# Patient Record
Sex: FEMALE | Race: BLACK OR AFRICAN AMERICAN | URBAN - METROPOLITAN AREA
[De-identification: names, ages, dates, MRNs, and addresses within clinical notes are randomized per-mention and may not be internally consistent; named-entity substitution may affect disease eponyms.]

---

## 2018-10-09 ENCOUNTER — ED HISTORICAL/CONVERTED ENCOUNTER (OUTPATIENT)
Dept: OTHER | Age: 68
End: 2018-10-09

## 2018-11-17 ENCOUNTER — IP HISTORICAL/CONVERTED ENCOUNTER (OUTPATIENT)
Dept: OTHER | Age: 68
End: 2018-11-17

## 2020-10-06 ENCOUNTER — APPOINTMENT (OUTPATIENT)
Dept: GENERAL RADIOLOGY | Age: 70
End: 2020-10-06
Attending: EMERGENCY MEDICINE
Payer: MEDICARE

## 2020-10-06 ENCOUNTER — APPOINTMENT (OUTPATIENT)
Dept: CT IMAGING | Age: 70
End: 2020-10-06
Attending: EMERGENCY MEDICINE
Payer: MEDICARE

## 2020-10-06 ENCOUNTER — HOSPITAL ENCOUNTER (EMERGENCY)
Age: 70
Discharge: HOME | End: 2020-10-06
Attending: EMERGENCY MEDICINE
Payer: MEDICARE

## 2020-10-06 DIAGNOSIS — K29.00 ACUTE SUPERFICIAL GASTRITIS WITHOUT HEMORRHAGE: Primary | ICD-10-CM

## 2020-10-06 LAB
ALBUMIN SERPL-MCNC: 3.3 G/DL (ref 3.5–5)
ALBUMIN/GLOB SERPL: 0.9 {RATIO} (ref 1.1–2.2)
ALP SERPL-CCNC: 140 U/L (ref 45–117)
ALT SERPL-CCNC: 41 U/L (ref 12–78)
AMYLASE SERPL-CCNC: 76 U/L (ref 25–115)
ANION GAP SERPL CALC-SCNC: 7 MMOL/L (ref 5–15)
AST SERPL W P-5'-P-CCNC: 19 U/L (ref 15–37)
BASOPHILS # BLD: 0 K/UL (ref 0–0.2)
BASOPHILS NFR BLD: 0 % (ref 0–2.5)
BILIRUB SERPL-MCNC: 0.3 MG/DL (ref 0.2–1)
BUN SERPL-MCNC: 22 MG/DL (ref 6–20)
BUN/CREAT SERPL: 4 (ref 12–20)
CA-I BLD-MCNC: 8.6 MG/DL (ref 8.5–10.1)
CHLORIDE SERPL-SCNC: 98 MMOL/L (ref 97–108)
CO2 SERPL-SCNC: 30 MMOL/L (ref 21–32)
CREAT SERPL-MCNC: 5.77 MG/DL (ref 0.55–1.02)
EOSINOPHIL # BLD: 0.3 K/UL (ref 0–0.7)
EOSINOPHIL NFR BLD: 5 % (ref 0.9–2.9)
ERYTHROCYTE [DISTWIDTH] IN BLOOD BY AUTOMATED COUNT: 17.5 % (ref 11.5–14.5)
GLOBULIN SER CALC-MCNC: 3.7 G/DL (ref 2–4)
GLUCOSE SERPL-MCNC: 195 MG/DL (ref 65–100)
HCT VFR BLD AUTO: 33 % (ref 36–46)
HGB BLD-MCNC: 10.9 G/DL (ref 13.5–17.5)
LIPASE SERPL-CCNC: 68 U/L (ref 73–393)
LYMPHOCYTES # BLD: 1.2 K/UL (ref 1–4.8)
LYMPHOCYTES NFR BLD: 22 % (ref 20.5–51.1)
MCH RBC QN AUTO: 32.8 PG (ref 31–34)
MCHC RBC AUTO-ENTMCNC: 33.1 G/DL (ref 31–36)
MCV RBC AUTO: 99 FL (ref 80–100)
MONOCYTES # BLD: 0.6 K/UL (ref 0.2–2.4)
MONOCYTES NFR BLD: 11 % (ref 1.7–9.3)
NEUTS SEG # BLD: 3.4 K/UL (ref 1.8–7.7)
NEUTS SEG NFR BLD: 62 % (ref 42–75)
NRBC # BLD: 0 K/UL
NRBC BLD-RTO: 0 PER 100 WBC
PLATELET # BLD AUTO: 130 K/UL
PMV BLD AUTO: 7.3 FL (ref 6.5–11.5)
POTASSIUM SERPL-SCNC: 3.9 MMOL/L (ref 3.5–5.1)
PROT SERPL-MCNC: 7 G/DL (ref 6.4–8.2)
RBC # BLD AUTO: 3.33 M/UL (ref 4.5–5.9)
SODIUM SERPL-SCNC: 135 MMOL/L (ref 136–145)
WBC # BLD AUTO: 5.6 K/UL (ref 4.4–11.3)

## 2020-10-06 PROCEDURE — 36415 COLL VENOUS BLD VENIPUNCTURE: CPT

## 2020-10-06 PROCEDURE — 74011250636 HC RX REV CODE- 250/636: Performed by: EMERGENCY MEDICINE

## 2020-10-06 PROCEDURE — 96375 TX/PRO/DX INJ NEW DRUG ADDON: CPT

## 2020-10-06 PROCEDURE — 74176 CT ABD & PELVIS W/O CONTRAST: CPT

## 2020-10-06 PROCEDURE — 82150 ASSAY OF AMYLASE: CPT

## 2020-10-06 PROCEDURE — 71045 X-RAY EXAM CHEST 1 VIEW: CPT

## 2020-10-06 PROCEDURE — 96374 THER/PROPH/DIAG INJ IV PUSH: CPT

## 2020-10-06 PROCEDURE — 85025 COMPLETE CBC W/AUTO DIFF WBC: CPT

## 2020-10-06 PROCEDURE — 99283 EMERGENCY DEPT VISIT LOW MDM: CPT

## 2020-10-06 PROCEDURE — 83690 ASSAY OF LIPASE: CPT

## 2020-10-06 PROCEDURE — 80053 COMPREHEN METABOLIC PANEL: CPT

## 2020-10-06 RX ORDER — PANTOPRAZOLE SODIUM 40 MG/1
40 TABLET, DELAYED RELEASE ORAL
Status: DISCONTINUED | OUTPATIENT
Start: 2020-10-06 | End: 2020-10-07 | Stop reason: HOSPADM

## 2020-10-06 RX ORDER — PANTOPRAZOLE SODIUM 40 MG/1
40 TABLET, DELAYED RELEASE ORAL DAILY
Qty: 20 TAB | Refills: 0 | Status: SHIPPED | OUTPATIENT
Start: 2020-10-06 | End: 2020-10-26

## 2020-10-06 RX ORDER — MORPHINE SULFATE 2 MG/ML
2 INJECTION, SOLUTION INTRAMUSCULAR; INTRAVENOUS
Status: COMPLETED | OUTPATIENT
Start: 2020-10-06 | End: 2020-10-06

## 2020-10-06 RX ORDER — ONDANSETRON 2 MG/ML
4 INJECTION INTRAMUSCULAR; INTRAVENOUS
Status: COMPLETED | OUTPATIENT
Start: 2020-10-06 | End: 2020-10-06

## 2020-10-06 RX ADMIN — MORPHINE SULFATE 2 MG: 2 INJECTION, SOLUTION INTRAMUSCULAR; INTRAVENOUS at 18:24

## 2020-10-06 RX ADMIN — ONDANSETRON 4 MG: 2 INJECTION INTRAMUSCULAR; INTRAVENOUS at 18:24

## 2020-10-07 VITALS
WEIGHT: 250 LBS | DIASTOLIC BLOOD PRESSURE: 78 MMHG | HEIGHT: 70 IN | TEMPERATURE: 98.7 F | OXYGEN SATURATION: 98 % | RESPIRATION RATE: 16 BRPM | BODY MASS INDEX: 35.79 KG/M2 | HEART RATE: 65 BPM | SYSTOLIC BLOOD PRESSURE: 128 MMHG

## 2020-10-07 NOTE — ED PROVIDER NOTES
HPI   Patient is a 72 y.o. female [address] who presents to the ER with a chief complaint of abdominal pain x 1-2 days  Chief Complaint   Patient presents with    Abdominal Pain      Past Medical History:   Diagnosis Date    Chronic kidney disease     Diabetes ([address]     Hemodialysis access site with mature fistula ([facility] [address]     left arm       No past surgical history on file. No family history on file. Social History     Socioeconomic History    Marital status:      Spouse name: Not on file    Number of children: Not on file    Years of education: Not on file    Highest education level: Not on file   Occupational History    Not on file   Social Needs    Financial resource strain: Not on file    Food insecurity     Worry: Not on file     Inability: Not on file    Transportation needs     Medical: Not on file     Non-medical: Not on file   Tobacco Use    Smoking status: Current Every Day Smoker     Packs/day: 0.50    Smokeless tobacco: Never Used   Substance and Sexual Activity    Alcohol use: Never     Frequency: Never    Drug use: Never    Sexual activity: Not on file   Lifestyle    Physical activity     Days per week: Not on file     Minutes per session: Not on file    Stress: Not on file   Relationships    Social connections     Talks on phone: Not on file     Gets together: Not on file     Attends Jew service: Not on file     Active member of club or organization: Not on file     Attends meetings of clubs or organizations: Not on file     Relationship status: Not on file    Intimate partner violence     Fear of current or ex partner: Not on file     Emotionally abused: Not on file     Physically abused: Not on file     Forced sexual activity: Not on file   Other Topics Concern    Not on file   Social History Narrative    Not on file         ALLERGIES: Heparin    Review of Systems   Constitutional: Negative. HENT: Negative. Eyes: Negative. Respiratory: Negative. Cardiovascular: Negative. Gastrointestinal: Negative. Endocrine: Negative. Genitourinary: Negative. Musculoskeletal: Negative. Skin: Negative. Allergic/Immunologic: Negative. Neurological: Negative. Hematological: Negative. Psychiatric/Behavioral: Negative. Vitals:    10/06/20 1723 10/06/20 1752 10/06/20 1755 10/06/20 2120   BP: (!) 157/55  (!) 129/48 128/78   Pulse: (!) 106  97 65   Resp: 20 20 16   Temp: 98.7 °F (37.1 °C)      SpO2: 93% 95% 97% 98%   Weight: 113.4 kg (250 lb)      Height: 5' 10\" (1.778 m)               Physical Exam  Vitals signs and nursing note reviewed. Constitutional:       Appearance: Normal appearance. She is normal weight. HENT:      Head: Normocephalic and atraumatic. Nose: Nose normal.   Eyes:      Extraocular Movements: Extraocular movements intact. Pupils: Pupils are equal, round, and reactive to light. Neck:      Musculoskeletal: Normal range of motion and neck supple. Cardiovascular:      Rate and Rhythm: Normal rate and regular rhythm. Pulses: Normal pulses. Pulmonary:      Effort: Pulmonary effort is normal.      Breath sounds: Normal breath sounds. Abdominal:      General: Abdomen is flat. Palpations: Abdomen is soft. Musculoskeletal: Normal range of motion. Skin:     General: Skin is warm and dry. Neurological:      General: No focal deficit present. Mental Status: She is alert and oriented to person, place, and time. Psychiatric:         Mood and Affect: Mood normal.          MDM       Procedures     ICD-10-CM ICD-9-CM    1.  Acute superficial gastritis without hemorrhage  K29.00 535.40

## 2020-11-11 ENCOUNTER — OFFICE VISIT (OUTPATIENT)
Dept: GASTROENTEROLOGY | Age: 70
End: 2020-11-11
Payer: MEDICARE

## 2020-11-11 VITALS
DIASTOLIC BLOOD PRESSURE: 58 MMHG | OXYGEN SATURATION: 97 % | HEART RATE: 61 BPM | TEMPERATURE: 97.4 F | SYSTOLIC BLOOD PRESSURE: 120 MMHG

## 2020-11-11 DIAGNOSIS — E11.22 TYPE 2 DIABETES MELLITUS WITH STAGE 4 CHRONIC KIDNEY DISEASE, WITH LONG-TERM CURRENT USE OF INSULIN (HCC): ICD-10-CM

## 2020-11-11 DIAGNOSIS — N18.4 STAGE 4 CHRONIC KIDNEY DISEASE (HCC): ICD-10-CM

## 2020-11-11 DIAGNOSIS — Z79.4 TYPE 2 DIABETES MELLITUS WITH STAGE 4 CHRONIC KIDNEY DISEASE, WITH LONG-TERM CURRENT USE OF INSULIN (HCC): ICD-10-CM

## 2020-11-11 DIAGNOSIS — Z86.010 HX OF COLONIC POLYP: ICD-10-CM

## 2020-11-11 DIAGNOSIS — I51.9 HEART DISEASE: ICD-10-CM

## 2020-11-11 DIAGNOSIS — K21.00 GASTROESOPHAGEAL REFLUX DISEASE WITH ESOPHAGITIS WITHOUT HEMORRHAGE: ICD-10-CM

## 2020-11-11 DIAGNOSIS — N18.4 TYPE 2 DIABETES MELLITUS WITH STAGE 4 CHRONIC KIDNEY DISEASE, WITH LONG-TERM CURRENT USE OF INSULIN (HCC): ICD-10-CM

## 2020-11-11 DIAGNOSIS — K59.04 CHRONIC IDIOPATHIC CONSTIPATION: Primary | ICD-10-CM

## 2020-11-11 DIAGNOSIS — E66.01 SEVERE OBESITY (HCC): ICD-10-CM

## 2020-11-11 PROBLEM — E03.9 HYPOTHYROIDISM: Status: ACTIVE | Noted: 2020-11-11

## 2020-11-11 PROBLEM — K59.00 CONSTIPATION: Status: ACTIVE | Noted: 2020-11-11

## 2020-11-11 PROBLEM — K21.9 ACID REFLUX: Status: ACTIVE | Noted: 2020-11-11

## 2020-11-11 PROBLEM — I10 BENIGN ESSENTIAL HTN: Status: ACTIVE | Noted: 2020-11-11

## 2020-11-11 PROBLEM — I50.9 CHRONIC CONGESTIVE HEART FAILURE (HCC): Status: ACTIVE | Noted: 2020-11-11

## 2020-11-11 PROBLEM — R25.2 SPASM: Status: ACTIVE | Noted: 2020-11-11

## 2020-11-11 PROBLEM — N17.9 ACUTE RENAL FAILURE (HCC): Status: ACTIVE | Noted: 2020-11-11

## 2020-11-11 PROBLEM — E11.9 TYPE II DIABETES MELLITUS (HCC): Status: ACTIVE | Noted: 2020-11-11

## 2020-11-11 PROCEDURE — 99214 OFFICE O/P EST MOD 30 MIN: CPT | Performed by: INTERNAL MEDICINE

## 2020-11-11 RX ORDER — ISOSORBIDE MONONITRATE 30 MG/1
60 TABLET, EXTENDED RELEASE ORAL
COMMUNITY

## 2020-11-11 RX ORDER — PRAVASTATIN SODIUM 40 MG/1
TABLET ORAL
COMMUNITY

## 2020-11-11 RX ORDER — DICLOFENAC SODIUM 10 MG/G
GEL TOPICAL
COMMUNITY

## 2020-11-11 RX ORDER — BLOOD SUGAR DIAGNOSTIC
STRIP MISCELLANEOUS
COMMUNITY
Start: 2020-10-20

## 2020-11-11 RX ORDER — PANTOPRAZOLE SODIUM 40 MG/1
40 TABLET, DELAYED RELEASE ORAL DAILY
Qty: 30 TAB | Refills: 5 | Status: SHIPPED | OUTPATIENT
Start: 2020-11-11 | End: 2021-06-01

## 2020-11-11 RX ORDER — COLCHICINE 0.6 MG/1
TABLET ORAL
COMMUNITY

## 2020-11-11 RX ORDER — OXYCODONE HYDROCHLORIDE 10 MG/1
TABLET, FILM COATED, EXTENDED RELEASE ORAL
COMMUNITY
Start: 2020-10-19

## 2020-11-11 RX ORDER — SILVER SULFADIAZINE 10 G/1000G
CREAM TOPICAL
COMMUNITY
Start: 2020-09-21

## 2020-11-11 RX ORDER — LEVOTHYROXINE SODIUM 50 UG/1
TABLET ORAL
COMMUNITY
Start: 2020-09-23

## 2020-11-11 RX ORDER — CLOPIDOGREL BISULFATE 75 MG/1
TABLET ORAL
COMMUNITY
Start: 2020-09-18

## 2020-11-11 RX ORDER — HYDRALAZINE HYDROCHLORIDE 100 MG/1
100 TABLET, FILM COATED ORAL DAILY
COMMUNITY
Start: 2020-09-10

## 2020-11-11 RX ORDER — SEVELAMER CARBONATE 800 MG/1
TABLET, FILM COATED ORAL
COMMUNITY
Start: 2020-10-27

## 2020-11-11 RX ORDER — ASPIRIN 325 MG
TABLET, DELAYED RELEASE (ENTERIC COATED) ORAL
COMMUNITY

## 2020-11-11 RX ORDER — OXYCODONE AND ACETAMINOPHEN 10; 325 MG/1; MG/1
TABLET ORAL
COMMUNITY
End: 2021-02-08 | Stop reason: ALTCHOICE

## 2020-11-11 RX ORDER — AMLODIPINE BESYLATE 10 MG/1
TABLET ORAL
COMMUNITY
End: 2021-08-29 | Stop reason: DRUGHIGH

## 2020-11-11 RX ORDER — LANCING DEVICE/LANCETS
KIT MISCELLANEOUS
COMMUNITY
End: 2021-08-29

## 2020-11-11 RX ORDER — PEN NEEDLE, DIABETIC 31 GX3/16"
NEEDLE, DISPOSABLE MISCELLANEOUS
COMMUNITY
End: 2021-08-29

## 2020-11-11 RX ORDER — CARVEDILOL 6.25 MG/1
TABLET ORAL
COMMUNITY

## 2020-11-11 RX ORDER — OXYCODONE AND ACETAMINOPHEN 7.5; 325 MG/1; MG/1
TABLET ORAL
COMMUNITY
Start: 2020-10-19

## 2020-11-11 RX ORDER — CHLORTHALIDONE 25 MG/1
TABLET ORAL
COMMUNITY
End: 2021-08-29

## 2020-11-11 RX ORDER — LISINOPRIL 40 MG/1
TABLET ORAL
COMMUNITY
Start: 2020-09-10 | End: 2021-08-29

## 2020-11-11 RX ORDER — BLOOD-GLUCOSE METER
EACH MISCELLANEOUS
COMMUNITY
Start: 2020-10-20

## 2020-11-11 RX ORDER — LANCETS
EACH MISCELLANEOUS
COMMUNITY
Start: 2020-10-20

## 2020-11-11 RX ORDER — TRIAZOLAM 0.25 MG/1
TABLET ORAL
COMMUNITY
Start: 2020-11-02 | End: 2021-02-08 | Stop reason: ALTCHOICE

## 2020-11-11 RX ORDER — FUROSEMIDE 40 MG/1
TABLET ORAL
COMMUNITY
Start: 2020-09-14 | End: 2021-08-29

## 2020-11-11 RX ORDER — INSULIN ASPART 100 [IU]/ML
INJECTION, SOLUTION INTRAVENOUS; SUBCUTANEOUS
COMMUNITY

## 2020-11-11 RX ORDER — LIDOCAINE AND PRILOCAINE 25; 25 MG/G; MG/G
CREAM TOPICAL
COMMUNITY
Start: 2020-09-28

## 2020-11-11 RX ORDER — ASPIRIN 81 MG/1
81 TABLET ORAL
COMMUNITY

## 2020-11-11 NOTE — PROGRESS NOTES
1. Have you been to the ER, urgent care clinic since your last visit? Hospitalized since your last visit? YES, RRNC X2 AND [place] X1 FOR ACID REFLUX    2. Have you seen or consulted any other health care providers outside of the [address] [place] [address] since your last visit? Include any pap smears or colon screening.  NO.

## 2020-11-11 NOTE — PROGRESS NOTES
Shirin Velasquez is a 72 y.o. female who presents today for the following:  Chief Complaint   Patient presents with    GERD     acid reflux    Constipation         Allergies   Allergen Reactions    Gabapentin Swelling    Codeine Unknown (comments)     TYLENOL # 3    Heparin Unknown (comments)     SAID MADE HER LOSE HER LEG    Lovenox [Enoxaparin] Unknown (comments)    Lyrica [Pregabalin] Unknown (comments)    Motrin [Ibuprofen] Unknown (comments)    Reglan [Metoclopramide Hcl] Unknown (comments)    Vioxx [Rofecoxib] Unknown (comments)       Current Outpatient Medications   Medication Sig    Lancing Device with Lancets (Accu-Chek FastClix Lancing Dev) kit Accu-Chek FastClix Lancing Device   USED TO CHECK BLOOD SUGAR DX:E11.65 3 TIMES A DAY -USING INSULIN 90 DAYS    Insulin Needles, Disposable, (BD Ultra-Fine Mini Pen Needle) 31 gauge x 3/16\" ndle BD Ultra-Fine Mini Pen Needle 31 gauge x 3/16\"    Insulin Needles, Disposable, (Natalie Pen Needle) 32 gauge x 5/32\" ndle BD Ultra-Fine Natalie Pen Needle 32 gauge x 5/32\"    diclofenac (Voltaren) 1 % gel Voltaren 1 % topical gel    amLODIPine (NORVASC) 10 mg tablet amlodipine 10 mg tablet    aspirin delayed-release 81 mg tablet Take 81 mg by mouth.     Accu-Chek Marcela Plus test strp strip USE TO CHECK BLOOD SUGAR THREE TIMES DAILY    Accu-Chek Guide Glucose Meter misc USE TO TEST BLOOD SUGAR THREE TIMES DAILY    carvediloL (COREG) 6.25 mg tablet carvedilol 6.25 mg tablet    chlorthalidone (HYGROTON) 25 mg tablet chlorthalidone 25 mg tablet    cholecalciferol (VITAMIN D3) (50,000 UNITS /1250 MCG) capsule cholecalciferol (vitamin D3) 1,250 mcg (50,000 unit) capsule   TAKE ONE CAPSULE BY MOUTH ONCE A WEEK FOR 8 WEEKS    clopidogreL (PLAVIX) 75 mg tab 1 TABLET BY MOUTH EVERY DAY    colchicine (Colcrys) 0.6 mg tablet Colcrys 0.6 mg tablet    furosemide (LASIX) 40 mg tablet TAKE 2 TABLETS BY MOUTH EVERY DAY    hydrALAZINE (APRESOLINE) 100 mg tablet TAKE ONE TABLET TWICE DAILY ON DIALYSIS DAYS AND THREE TIMES DAILY OTHER DAYS    insulin aspart U-100 (NovoLOG U-100 Insulin aspart) 100 unit/mL injection Novolog U-100 Insulin aspart 100 unit/mL subcutaneous solution    insulin detemir U-100 (Levemir U-100 Insulin) 100 unit/mL injection Levemir U-100 Insulin 100 unit/mL subcutaneous solution    isosorbide mononitrate ER (IMDUR) 30 mg tablet isosorbide mononitrate ER 30 mg tablet,extended release 24 hr    Accu-Chek Fastclix Lancet Drum misc CHECK BLOOD SUGAR THREE TIMES DAILY    levothyroxine (SYNTHROID) 50 mcg tablet TAKE 1 TABLET BY MOUTH IN THE MORNING ON AN EMPTY STOMACH ONCE DAILY    lidocaine-prilocaine (EMLA) topical cream APPLY SMALL AMOUNT TO ACCESS SITE (AVF) 1 TO 2 HOURS BEFORE DIALYSIS. COVER WITH OCCLUSIVE DRESSING (SARAN WRAP) THREE DAYS A WEEK (3 TIMES    lisinopriL (PRINIVIL, ZESTRIL) 40 mg tablet TAKE 1 TABLET BY MOUTH DAILY IF SYSTOLIC >746    OxyCONTIN 10 mg ER tablet TAKE ONE TABLET BY MOUTH EVERY TWELVE HOURS    oxyCODONE-acetaminophen (PERCOCET 10)  mg per tablet oxycodone-acetaminophen 10 mg-325 mg tablet    oxyCODONE-acetaminophen (PERCOCET 7.5) 7.5-325 mg per tablet TAKE ONE TABLET BY MOUTH THREE TIMES DAILY AS NEEDED    pravastatin (PRAVACHOL) 40 mg tablet pravastatin 40 mg tablet    Renvela 800 mg tab tab TAKE 3 TABLETS BY MOUTH THREE TIMES DAILY WITH MEALS AND 2 TABLETS WITH SNACKS    silver sulfADIAZINE (SILVADENE) 1 % topical cream APPLY TO AFFECTED AREA ONCE DAILY    triazolam (HALCION) 0.25 mg tablet TAKE ONE tablet AT BEDTIME AS NEEDED    linaCLOtide (Linzess) 145 mcg cap capsule Take 1 Cap by mouth Daily (before breakfast). Indications: chronic idiopathic constipation    pantoprazole (PROTONIX) 40 mg tablet Take 1 Tab by mouth daily. Indications: inflammation of the esophagus with erosion, gastroesophageal reflux disease, heartburn     No current facility-administered medications for this visit.         Past Medical History:   Diagnosis Date    Acid reflux 11/11/2020    Acute renal failure ([facility] [address] 11/11/2020    Benign essential HTN 11/11/2020    Chronic congestive heart failure ([facility] [address] 11/11/2020    Chronic kidney disease     Constipation 11/11/2020    Diabetes ([facility] [address]     Heart disease 11/11/2020    Hemodialysis access site with mature fistula (HCC)     left arm    Hx of colonic polyp     Hypothyroidism 11/11/2020    MI (myocardial infarction) ([facility] [address]     OLD MI    Spasm 11/11/2020    Stage 4 chronic kidney disease ([facility] [address] 11/11/2020    Type II diabetes mellitus ([facility] [address] 11/11/2020       Past Surgical History:   Procedure Laterality Date    CARDIAC SURG PROCEDURE UNLIST  2012, 2015    STENT PLACEMENT    HX APPENDECTOMY      HX CHOLECYSTECTOMY      HX COLONOSCOPY  06/16/2015    RECTAL POLYP    HX COLONOSCOPY  03/25/2013    EGD AND COLONOSCOPY    HX COLONOSCOPY  10/30/2019    COLONOSCOPY-CECAL POLYP, NUMEROUS RECTAL POLYPS    HX GI  10/08/2019    EGD - BX-GERD, BLOATING, ESOPHAGITIS    HX GI  6-16-2-15    EGD    HX GI  03/25/2013    EGD    HX GYN      HYSTERECTOMY    HX ORTHOPAEDIC      R LEG AMPUTATION       Family History   Problem Relation Age of Onset    Diabetes Other     Lung Disease Other     COPD Other        Social History     Socioeconomic History    Marital status:      Spouse name: Not on file    Number of children: Not on file    Years of education: Not on file    Highest education level: Not on file   Occupational History    Not on file   Social Needs    Financial resource strain: Not on file    Food insecurity     Worry: Not on file     Inability: Not on file   [employer] needs     Medical: Not on file     Non-medical: Not on file   Tobacco Use    Smoking status: Current Every Day Smoker     Packs/day: 0.50    Smokeless tobacco: Never Used   Substance and Sexual Activity    Alcohol use: Never     Frequency: Never    Drug use: Never    Sexual activity: Not on file Lifestyle    Physical activity     Days per week: Not on file     Minutes per session: Not on file    Stress: Not on file   Relationships    Social connections     Talks on phone: Not on file     Gets together: Not on file     Attends [employer] service: Not on file     Active member of club or organization: Not on file     Attends meetings of clubs or organizations: Not on file     Relationship status: Not on file    Intimate partner violence     Fear of current or ex partner: Not on file     Emotionally abused: Not on file     Physically abused: Not on file     Forced sexual activity: Not on file   Other Topics Concern    Not on file   Social History Narrative    Not on file         HPI  60-year-old female with history of hypertension, hyperlipidemia, end-stage renal disease dialysis dependent, peripheral vascular disease, status post right leg amputation, diabetes mellitus, coronary artery disease, and colon polyps who comes in for follow-up visit for GERD and constipation. Patient states she has a lot of problems with constipation and GERD recently. She states she was told not to take the Nexium secondary to being on Plavix so her reflux symptoms have gotten severe. She states she now has poor appetite. She also states is constipated all the time. She has tried lactulose which causes increased gas. She uses MiraLAX which is not work in the past.  She states she may have nausea and vomiting on eating. She also states she does use Dulcolax without help. Review of Systems   Constitutional: Negative. HENT: Negative. Negative for nosebleeds. Eyes: Negative. Respiratory: Negative. Cardiovascular: Negative. Gastrointestinal: Positive for abdominal pain, constipation, heartburn, nausea and vomiting. Negative for blood in stool, diarrhea and melena. Genitourinary: Negative. Musculoskeletal: Negative. Skin: Negative. Neurological: Negative. Endo/Heme/Allergies: Negative. Psychiatric/Behavioral: Negative. All other systems reviewed and are negative. Visit Vitals  BP (!) 120/58 (BP 1 Location: Right arm, BP Patient Position: Sitting)   Pulse 61   Temp 97.4 °F (36.3 °C) (Skin)   SpO2 97% Comment: room air     Physical Exam  Vitals signs and nursing note reviewed. Constitutional:       General: She is not in acute distress. Appearance: Normal appearance. She is obese. She is not ill-appearing. HENT:      Head: Normocephalic and atraumatic. Nose: Nose normal.      Mouth/Throat:      Mouth: Mucous membranes are moist.      Pharynx: Oropharynx is clear. Eyes:      General: No scleral icterus. Conjunctiva/sclera: Conjunctivae normal.      Pupils: Pupils are equal, round, and reactive to light. Neck:      Musculoskeletal: Normal range of motion and neck supple. Cardiovascular:      Rate and Rhythm: Normal rate and regular rhythm. Pulses: Normal pulses. Heart sounds: Normal heart sounds. Pulmonary:      Effort: Pulmonary effort is normal.      Breath sounds: Normal breath sounds. Abdominal:      General: Bowel sounds are normal. There is no distension. Palpations: Abdomen is soft. There is no mass. Tenderness: There is abdominal tenderness. There is guarding. There is no right CVA tenderness, left CVA tenderness or rebound. Hernia: No hernia is present. Musculoskeletal: Normal range of motion. Comments: Right BKA   Skin:     General: Skin is warm and dry. Coloration: Skin is not jaundiced. Neurological:      General: No focal deficit present. Mental Status: She is alert and oriented to person, place, and time. Psychiatric:         Mood and Affect: Mood normal.         Behavior: Behavior normal.         Thought Content: Thought content normal.         Judgment: Judgment normal.            1. Chronic idiopathic constipation  Give patient a trial of Linzess 145 mcg daily.   Was advised to take the medication with a meal.  States she will take it on days she does not have dialysis. - linaCLOtide (Linzess) 145 mcg cap capsule; Take 1 Cap by mouth Daily (before breakfast). Indications: chronic idiopathic constipation  Dispense: 30 Cap; Refill: 3    2. Gastroesophageal reflux disease with esophagitis without hemorrhage  Patient has severe reflux symptoms and will need acid lowering medicine to decrease symptoms. We will give her a trial of pantoprazole 40 mg daily. Take the medication prior to her evening meal.  - pantoprazole (PROTONIX) 40 mg tablet; Take 1 Tab by mouth daily. Indications: inflammation of the esophagus with erosion, gastroesophageal reflux disease, heartburn  Dispense: 30 Tab; Refill: 5    3. Stage 4 chronic kidney disease ([address]      4. Type 2 diabetes mellitus with stage 4 chronic kidney disease, with long-term current use of insulin ([address]      5. Hx of colonic polyp      6.  Severe obesity ([address]

## 2021-02-08 ENCOUNTER — OFFICE VISIT (OUTPATIENT)
Dept: GASTROENTEROLOGY | Age: 71
End: 2021-02-08
Payer: MEDICARE

## 2021-02-08 VITALS
HEART RATE: 61 BPM | HEIGHT: 70 IN | OXYGEN SATURATION: 97 % | DIASTOLIC BLOOD PRESSURE: 60 MMHG | RESPIRATION RATE: 14 BRPM | SYSTOLIC BLOOD PRESSURE: 130 MMHG | BODY MASS INDEX: 35.87 KG/M2 | TEMPERATURE: 97.7 F

## 2021-02-08 DIAGNOSIS — K59.04 CHRONIC IDIOPATHIC CONSTIPATION: ICD-10-CM

## 2021-02-08 DIAGNOSIS — R11.0 NAUSEA: ICD-10-CM

## 2021-02-08 DIAGNOSIS — K21.00 GASTROESOPHAGEAL REFLUX DISEASE WITH ESOPHAGITIS WITHOUT HEMORRHAGE: ICD-10-CM

## 2021-02-08 DIAGNOSIS — R14.0 BLOATING: Primary | ICD-10-CM

## 2021-02-08 DIAGNOSIS — Z86.010 HISTORY OF COLON POLYPS: ICD-10-CM

## 2021-02-08 PROCEDURE — G8417 CALC BMI ABV UP PARAM F/U: HCPCS | Performed by: INTERNAL MEDICINE

## 2021-02-08 PROCEDURE — G8754 DIAS BP LESS 90: HCPCS | Performed by: INTERNAL MEDICINE

## 2021-02-08 PROCEDURE — G8427 DOCREV CUR MEDS BY ELIG CLIN: HCPCS | Performed by: INTERNAL MEDICINE

## 2021-02-08 PROCEDURE — G8432 DEP SCR NOT DOC, RNG: HCPCS | Performed by: INTERNAL MEDICINE

## 2021-02-08 PROCEDURE — G8752 SYS BP LESS 140: HCPCS | Performed by: INTERNAL MEDICINE

## 2021-02-08 PROCEDURE — 99214 OFFICE O/P EST MOD 30 MIN: CPT | Performed by: INTERNAL MEDICINE

## 2021-02-08 PROCEDURE — G8400 PT W/DXA NO RESULTS DOC: HCPCS | Performed by: INTERNAL MEDICINE

## 2021-02-08 PROCEDURE — 3017F COLORECTAL CA SCREEN DOC REV: CPT | Performed by: INTERNAL MEDICINE

## 2021-02-08 PROCEDURE — 1101F PT FALLS ASSESS-DOCD LE1/YR: CPT | Performed by: INTERNAL MEDICINE

## 2021-02-08 PROCEDURE — G8536 NO DOC ELDER MAL SCRN: HCPCS | Performed by: INTERNAL MEDICINE

## 2021-02-08 PROCEDURE — 1090F PRES/ABSN URINE INCON ASSESS: CPT | Performed by: INTERNAL MEDICINE

## 2021-02-08 RX ORDER — DICYCLOMINE HYDROCHLORIDE 10 MG/1
10 CAPSULE ORAL
Qty: 90 CAP | Refills: 3 | Status: SHIPPED | OUTPATIENT
Start: 2021-02-08

## 2021-02-08 NOTE — PROGRESS NOTES
1. Have you been to the ER, urgent care clinic since your last visit? Hospitalized since your last visit? Yes, ER RRNC, NAUSEA. COVID NEG    2. Have you seen or consulted any other health care providers outside of the [address] [place] [address] since your last visit? Include any pap smears or colon screening.  NO.

## 2021-02-10 NOTE — PROGRESS NOTES
Jazzmine Bravo is a 72 y.o. female who presents today for the following:  Chief Complaint   Patient presents with    Follow-up     f/u 11- is still having nausea and decreased appetite         Allergies   Allergen Reactions    Gabapentin Swelling    Codeine Unknown (comments)     TYLENOL # 3    Heparin Unknown (comments)     SAID MADE HER LOSE HER LEG    Lovenox [Enoxaparin] Unknown (comments)    Lyrica [Pregabalin] Unknown (comments)    Motrin [Ibuprofen] Unknown (comments)    Reglan [Metoclopramide Hcl] Unknown (comments)    Vioxx [Rofecoxib] Unknown (comments)       Current Outpatient Medications   Medication Sig    dicyclomine (BENTYL) 10 mg capsule Take 1 Cap by mouth three (3) times daily as needed for Abdominal Cramps. Indications: irritable colon    Lancing Device with Lancets (Accu-Chek FastClix Lancing Dev) kit Accu-Chek FastClix Lancing Device   USED TO CHECK BLOOD SUGAR DX:E11.65 3 TIMES A DAY -USING INSULIN 90 DAYS    Insulin Needles, Disposable, (BD Ultra-Fine Mini Pen Needle) 31 gauge x 3/16\" ndle BD Ultra-Fine Mini Pen Needle 31 gauge x 3/16\"    Insulin Needles, Disposable, (Natalie Pen Needle) 32 gauge x 5/32\" ndle BD Ultra-Fine Natalie Pen Needle 32 gauge x 5/32\"    diclofenac (Voltaren) 1 % gel Voltaren 1 % topical gel    amLODIPine (NORVASC) 10 mg tablet amlodipine 10 mg tablet    aspirin delayed-release 81 mg tablet Take 81 mg by mouth.     Accu-Chek Marcela Plus test strp strip USE TO CHECK BLOOD SUGAR THREE TIMES DAILY    Accu-Chek Guide Glucose Meter misc USE TO TEST BLOOD SUGAR THREE TIMES DAILY    carvediloL (COREG) 6.25 mg tablet carvedilol 6.25 mg tablet    chlorthalidone (HYGROTON) 25 mg tablet chlorthalidone 25 mg tablet    cholecalciferol (VITAMIN D3) (50,000 UNITS /1250 MCG) capsule cholecalciferol (vitamin D3) 1,250 mcg (50,000 unit) capsule   TAKE ONE CAPSULE BY MOUTH ONCE A WEEK FOR 8 WEEKS    clopidogreL (PLAVIX) 75 mg tab 1 TABLET BY MOUTH EVERY DAY    colchicine (Colcrys) 0.6 mg tablet Colcrys 0.6 mg tablet    furosemide (LASIX) 40 mg tablet TAKE 2 TABLETS BY MOUTH EVERY DAY    hydrALAZINE (APRESOLINE) 100 mg tablet TAKE ONE TABLET TWICE DAILY ON DIALYSIS DAYS AND THREE TIMES DAILY OTHER DAYS    insulin aspart U-100 (NovoLOG U-100 Insulin aspart) 100 unit/mL injection Novolog U-100 Insulin aspart 100 unit/mL subcutaneous solution    insulin detemir U-100 (Levemir U-100 Insulin) 100 unit/mL injection Levemir U-100 Insulin 100 unit/mL subcutaneous solution    isosorbide mononitrate ER (IMDUR) 30 mg tablet isosorbide mononitrate ER 30 mg tablet,extended release 24 hr    Accu-Chek Fastclix Lancet Drum misc CHECK BLOOD SUGAR THREE TIMES DAILY    levothyroxine (SYNTHROID) 50 mcg tablet TAKE 1 TABLET BY MOUTH IN THE MORNING ON AN EMPTY STOMACH ONCE DAILY    lidocaine-prilocaine (EMLA) topical cream APPLY SMALL AMOUNT TO ACCESS SITE (AVF) 1 TO 2 HOURS BEFORE DIALYSIS. COVER WITH OCCLUSIVE DRESSING (SARAN WRAP) THREE DAYS A WEEK (3 TIMES    OxyCONTIN 10 mg ER tablet TAKE ONE TABLET BY MOUTH EVERY TWELVE HOURS    oxyCODONE-acetaminophen (PERCOCET 7.5) 7.5-325 mg per tablet TAKE ONE TABLET BY MOUTH THREE TIMES DAILY AS NEEDED    pravastatin (PRAVACHOL) 40 mg tablet pravastatin 40 mg tablet    Renvela 800 mg tab tab TAKE 3 TABLETS BY MOUTH THREE TIMES DAILY WITH MEALS AND 2 TABLETS WITH SNACKS    silver sulfADIAZINE (SILVADENE) 1 % topical cream APPLY TO AFFECTED AREA ONCE DAILY    linaCLOtide (Linzess) 145 mcg cap capsule Take 1 Cap by mouth Daily (before breakfast). Indications: chronic idiopathic constipation    pantoprazole (PROTONIX) 40 mg tablet Take 1 Tab by mouth daily. Indications: inflammation of the esophagus with erosion, gastroesophageal reflux disease, heartburn    lisinopriL (PRINIVIL, ZESTRIL) 40 mg tablet TAKE 1 TABLET BY MOUTH DAILY IF SYSTOLIC >181     No current facility-administered medications for this visit.         Past Medical History: Diagnosis Date    Acid reflux 11/11/2020    Acute renal failure ([facility] [address] 11/11/2020    Benign essential HTN 11/11/2020    Chronic congestive heart failure ([facility] [address] 11/11/2020    Chronic kidney disease     Constipation 11/11/2020    Diabetes ([facility] [address]     Heart disease 11/11/2020    Hemodialysis access site with mature fistula (HCC)     left arm    Hx of colonic polyp     Hypothyroidism 11/11/2020    MI (myocardial infarction) ([facility] [address]     OLD MI    Spasm 11/11/2020    Stage 4 chronic kidney disease ([facility] [address] 11/11/2020    Type II diabetes mellitus ([facility] [address] 11/11/2020       Past Surgical History:   Procedure Laterality Date    CARDIAC SURG PROCEDURE UNLIST  2012, 2015    STENT PLACEMENT    HX APPENDECTOMY      HX CHOLECYSTECTOMY      HX COLONOSCOPY  06/16/2015    RECTAL POLYP    HX COLONOSCOPY  03/25/2013    EGD AND COLONOSCOPY    HX COLONOSCOPY  10/30/2019    COLONOSCOPY-CECAL POLYP, NUMEROUS RECTAL POLYPS    HX GI  10/08/2019    EGD - BX-GERD, BLOATING, ESOPHAGITIS    HX GI  6-16-2-15    EGD    HX GI  03/25/2013    EGD    HX GYN      HYSTERECTOMY    HX ORTHOPAEDIC      R LEG AMPUTATION       Family History   Problem Relation Age of Onset    Diabetes Other     Lung Disease Other     COPD Other        Social History     Socioeconomic History    Marital status:      Spouse name: Not on file    Number of children: Not on file    Years of education: Not on file    Highest education level: Not on file   Occupational History    Not on file   Social Needs    Financial resource strain: Not on file    Food insecurity     Worry: Not on file     Inability: Not on file   [employer] needs     Medical: Not on file     Non-medical: Not on file   Tobacco Use    Smoking status: Current Every Day Smoker     Packs/day: 0.50    Smokeless tobacco: Never Used   Substance and Sexual Activity    Alcohol use: Never     Frequency: Never    Drug use: Never    Sexual activity: Not on file   Lifestyle    Physical activity     Days per week: Not on file     Minutes per session: Not on file    Stress: Not on file   Relationships    Social connections     Talks on phone: Not on file     Gets together: Not on file     Attends Taoism service: Not on file     Active member of club or organization: Not on file     Attends meetings of clubs or organizations: Not on file     Relationship status: Not on file    Intimate partner violence     Fear of current or ex partner: Not on file     Emotionally abused: Not on file     Physically abused: Not on file     Forced sexual activity: Not on file   Other Topics Concern    Not on file   Social History Narrative    Not on file         HPI  50-year-old female with history of hypertensive atherosclerotic cardiovascular disease, hyperlipidemia, end-stage renal disease dialysis dependent, peripheral vascular disease, status post right leg amputation, diabetes mellitus type 2, coronary artery disease, and colon polyps who comes in for follow-up visit. Patient states she has been doing well. She however continues to be nauseated all the time. She states for a lot and has no appetite. Takes the pantoprazole 40 mg daily. States that her bowel movements are much better on the Linzess 145 mcg daily. Review of Systems   Constitutional: Negative. HENT: Negative. Negative for nosebleeds. Eyes: Negative. Respiratory: Negative. Cardiovascular: Negative. Gastrointestinal: Positive for abdominal pain, constipation, heartburn and nausea. Negative for blood in stool, diarrhea, melena and vomiting. Genitourinary: Negative. Musculoskeletal: Negative. Skin: Negative. Neurological: Negative. Endo/Heme/Allergies: Negative. Psychiatric/Behavioral: Negative. All other systems reviewed and are negative.         Visit Vitals  /60 (BP 1 Location: Right upper arm, BP Patient Position: Sitting, BP Cuff Size: Large adult)   Pulse 61   Temp 97.7 °F (36.5 °C) (Skin)   Resp 14   Ht 5' 10\" (1.778 m)   SpO2 97% Comment: room air   BMI 35.87 kg/m²     Physical Exam  Vitals signs and nursing note reviewed. Constitutional:       General: She is not in acute distress. Appearance: Normal appearance. She is obese. She is not ill-appearing. HENT:      Head: Normocephalic and atraumatic. Nose: Nose normal.      Mouth/Throat:      Mouth: Mucous membranes are moist.      Pharynx: Oropharynx is clear. Eyes:      General: No scleral icterus. Conjunctiva/sclera: Conjunctivae normal.      Pupils: Pupils are equal, round, and reactive to light. Neck:      Musculoskeletal: Normal range of motion and neck supple. Cardiovascular:      Rate and Rhythm: Normal rate and regular rhythm. Pulses: Normal pulses. Heart sounds: Normal heart sounds. Pulmonary:      Effort: Pulmonary effort is normal.      Breath sounds: Normal breath sounds. Abdominal:      General: Bowel sounds are normal. There is no distension. Palpations: Abdomen is soft. There is no mass. Tenderness: There is abdominal tenderness. There is guarding. There is no right CVA tenderness, left CVA tenderness or rebound. Hernia: No hernia is present. Musculoskeletal: Normal range of motion. Comments: Right AKA   Skin:     General: Skin is warm and dry. Coloration: Skin is not jaundiced. Neurological:      General: No focal deficit present. Mental Status: She is alert and oriented to person, place, and time. Psychiatric:         Mood and Affect: Mood normal.         Behavior: Behavior normal.         Thought Content: Thought content normal.         Judgment: Judgment normal.            1. Bloating  We will give patient trial of dicyclomine as needed. - dicyclomine (BENTYL) 10 mg capsule; Take 1 Cap by mouth three (3) times daily as needed for Abdominal Cramps. Indications: irritable colon  Dispense: 90 Cap; Refill: 3    2. History of colon polyps      3. Nausea  Patient is to continue taking the pantoprazole 40 mg daily. 4. Gastroesophageal reflux disease with esophagitis without hemorrhage      5. Chronic idiopathic constipation  The Linzess worked well for the patient.

## 2021-05-27 DIAGNOSIS — K21.00 GASTROESOPHAGEAL REFLUX DISEASE WITH ESOPHAGITIS WITHOUT HEMORRHAGE: ICD-10-CM

## 2021-06-01 RX ORDER — PANTOPRAZOLE SODIUM 40 MG/1
TABLET, DELAYED RELEASE ORAL
Qty: 30 TABLET | Refills: 5 | Status: SHIPPED | OUTPATIENT
Start: 2021-06-01

## 2021-08-27 ENCOUNTER — APPOINTMENT (OUTPATIENT)
Dept: GENERAL RADIOLOGY | Age: 71
End: 2021-08-27
Attending: PHYSICIAN ASSISTANT
Payer: MEDICARE

## 2021-08-27 ENCOUNTER — HOSPITAL ENCOUNTER (EMERGENCY)
Age: 71
Discharge: HOME | End: 2021-08-28
Payer: MEDICARE

## 2021-08-27 DIAGNOSIS — T82.590A MALFUNCTION OF ARTERIOVENOUS DIALYSIS FISTULA, INITIAL ENCOUNTER (HCC): Primary | ICD-10-CM

## 2021-08-27 DIAGNOSIS — N18.6 ESRD ON DIALYSIS (HCC): ICD-10-CM

## 2021-08-27 DIAGNOSIS — Z99.2 ESRD ON DIALYSIS (HCC): ICD-10-CM

## 2021-08-27 LAB
BASOPHILS # BLD: 0 K/UL (ref 0–0.1)
BASOPHILS NFR BLD: 0 % (ref 0–1)
DIFFERENTIAL METHOD BLD: ABNORMAL
EOSINOPHIL # BLD: 0.4 K/UL (ref 0–0.4)
EOSINOPHIL NFR BLD: 4 % (ref 0–7)
ERYTHROCYTE [DISTWIDTH] IN BLOOD BY AUTOMATED COUNT: 16.2 % (ref 11.5–14.5)
HCT VFR BLD AUTO: 33.2 % (ref 35–47)
HGB BLD-MCNC: 10.7 G/DL (ref 11.5–16)
IMM GRANULOCYTES # BLD AUTO: 0.2 K/UL (ref 0–0.04)
IMM GRANULOCYTES NFR BLD AUTO: 2 % (ref 0–0.5)
LYMPHOCYTES # BLD: 1.2 K/UL (ref 0.8–3.5)
LYMPHOCYTES NFR BLD: 13 % (ref 12–49)
MCH RBC QN AUTO: 32.7 PG (ref 26–34)
MCHC RBC AUTO-ENTMCNC: 32.2 G/DL (ref 30–36.5)
MCV RBC AUTO: 101.5 FL (ref 80–99)
MONOCYTES # BLD: 1.1 K/UL (ref 0–1)
MONOCYTES NFR BLD: 11 % (ref 5–13)
NEUTS SEG # BLD: 6.6 K/UL (ref 1.8–8)
NEUTS SEG NFR BLD: 70 % (ref 32–75)
NRBC # BLD: 0 K/UL (ref 0–0.01)
NRBC BLD-RTO: 0 PER 100 WBC
PLATELET # BLD AUTO: 198 K/UL (ref 150–400)
PMV BLD AUTO: 9.7 FL (ref 8.9–12.9)
RBC # BLD AUTO: 3.27 M/UL (ref 3.8–5.2)
WBC # BLD AUTO: 9.5 K/UL (ref 3.6–11)

## 2021-08-27 PROCEDURE — 85025 COMPLETE CBC W/AUTO DIFF WBC: CPT

## 2021-08-27 PROCEDURE — 36415 COLL VENOUS BLD VENIPUNCTURE: CPT

## 2021-08-27 PROCEDURE — 99283 EMERGENCY DEPT VISIT LOW MDM: CPT

## 2021-08-27 PROCEDURE — 71045 X-RAY EXAM CHEST 1 VIEW: CPT

## 2021-08-28 ENCOUNTER — HOSPITAL ENCOUNTER (OUTPATIENT)
Age: 71
Setting detail: OBSERVATION
Discharge: HOME | End: 2021-08-30
Attending: EMERGENCY MEDICINE | Admitting: HOSPITALIST
Payer: MEDICARE

## 2021-08-28 ENCOUNTER — APPOINTMENT (OUTPATIENT)
Dept: GENERAL RADIOLOGY | Age: 71
End: 2021-08-28
Attending: EMERGENCY MEDICINE
Payer: MEDICARE

## 2021-08-28 VITALS
SYSTOLIC BLOOD PRESSURE: 155 MMHG | RESPIRATION RATE: 16 BRPM | WEIGHT: 240 LBS | TEMPERATURE: 98.5 F | OXYGEN SATURATION: 98 % | HEART RATE: 72 BPM | HEIGHT: 70 IN | DIASTOLIC BLOOD PRESSURE: 86 MMHG | BODY MASS INDEX: 34.36 KG/M2

## 2021-08-28 DIAGNOSIS — E87.79 OTHER HYPERVOLEMIA: Primary | ICD-10-CM

## 2021-08-28 DIAGNOSIS — I10 HYPERTENSION, UNSPECIFIED TYPE: ICD-10-CM

## 2021-08-28 DIAGNOSIS — N18.6 END STAGE RENAL DISEASE (HCC): ICD-10-CM

## 2021-08-28 PROBLEM — E87.70 HYPERVOLEMIA: Status: ACTIVE | Noted: 2021-08-28

## 2021-08-28 LAB
ALBUMIN SERPL-MCNC: 2.3 G/DL (ref 3.5–5)
ALBUMIN/GLOB SERPL: 0.5 {RATIO} (ref 1.1–2.2)
ALP SERPL-CCNC: 102 U/L (ref 45–117)
ALT SERPL-CCNC: 20 U/L (ref 12–78)
ANION GAP SERPL CALC-SCNC: 10 MMOL/L (ref 5–15)
ANION GAP SERPL CALC-SCNC: 12 MMOL/L (ref 5–15)
AST SERPL-CCNC: 30 U/L (ref 15–37)
BASOPHILS # BLD: 0.1 K/UL (ref 0–0.1)
BASOPHILS NFR BLD: 1 % (ref 0–1)
BILIRUB SERPL-MCNC: 0.3 MG/DL (ref 0.2–1)
BUN SERPL-MCNC: 49 MG/DL (ref 6–20)
BUN SERPL-MCNC: 53 MG/DL (ref 6–20)
BUN/CREAT SERPL: 4 (ref 12–20)
BUN/CREAT SERPL: 4 (ref 12–20)
CA-I BLD-MCNC: 9.4 MG/DL (ref 8.5–10.1)
CALCIUM SERPL-MCNC: 10 MG/DL (ref 8.5–10.1)
CHLORIDE SERPL-SCNC: 100 MMOL/L (ref 97–108)
CHLORIDE SERPL-SCNC: 102 MMOL/L (ref 97–108)
CO2 SERPL-SCNC: 21 MMOL/L (ref 21–32)
CO2 SERPL-SCNC: 22 MMOL/L (ref 21–32)
COMMENT, HOLDF: NORMAL
CREAT SERPL-MCNC: 12.6 MG/DL (ref 0.55–1.02)
CREAT SERPL-MCNC: 14.5 MG/DL (ref 0.55–1.02)
DIFFERENTIAL METHOD BLD: ABNORMAL
EOSINOPHIL # BLD: 0.3 K/UL (ref 0–0.4)
EOSINOPHIL NFR BLD: 3 % (ref 0–7)
ERYTHROCYTE [DISTWIDTH] IN BLOOD BY AUTOMATED COUNT: 16.3 % (ref 11.5–14.5)
GLOBULIN SER CALC-MCNC: 4.6 G/DL (ref 2–4)
GLUCOSE SERPL-MCNC: 154 MG/DL (ref 65–100)
GLUCOSE SERPL-MCNC: 155 MG/DL (ref 65–100)
HCT VFR BLD AUTO: 32.9 % (ref 35–47)
HGB BLD-MCNC: 10.3 G/DL (ref 11.5–16)
IMM GRANULOCYTES # BLD AUTO: 0.2 K/UL (ref 0–0.04)
IMM GRANULOCYTES NFR BLD AUTO: 2 % (ref 0–0.5)
LYMPHOCYTES # BLD: 1.4 K/UL (ref 0.8–3.5)
LYMPHOCYTES NFR BLD: 15 % (ref 12–49)
MAGNESIUM SERPL-MCNC: 2.1 MG/DL (ref 1.6–2.4)
MCH RBC QN AUTO: 32.4 PG (ref 26–34)
MCHC RBC AUTO-ENTMCNC: 31.3 G/DL (ref 30–36.5)
MCV RBC AUTO: 103.5 FL (ref 80–99)
MONOCYTES # BLD: 1.3 K/UL (ref 0–1)
MONOCYTES NFR BLD: 14 % (ref 5–13)
NEUTS SEG # BLD: 6.1 K/UL (ref 1.8–8)
NEUTS SEG NFR BLD: 65 % (ref 32–75)
NRBC # BLD: 0 K/UL (ref 0–0.01)
NRBC BLD-RTO: 0 PER 100 WBC
PHOSPHATE SERPL-MCNC: 3.4 MG/DL (ref 2.6–4.7)
PLATELET # BLD AUTO: 185 K/UL (ref 150–400)
PMV BLD AUTO: 9.6 FL (ref 8.9–12.9)
POTASSIUM SERPL-SCNC: 4.2 MMOL/L (ref 3.5–5.1)
POTASSIUM SERPL-SCNC: 4.3 MMOL/L (ref 3.5–5.1)
PROT SERPL-MCNC: 6.9 G/DL (ref 6.4–8.2)
RBC # BLD AUTO: 3.18 M/UL (ref 3.8–5.2)
SAMPLES BEING HELD,HOLD: NORMAL
SODIUM SERPL-SCNC: 133 MMOL/L (ref 136–145)
SODIUM SERPL-SCNC: 134 MMOL/L (ref 136–145)
TROPONIN I SERPL-MCNC: <0.05 NG/ML
WBC # BLD AUTO: 9.4 K/UL (ref 3.6–11)

## 2021-08-28 PROCEDURE — 99218 HC RM OBSERVATION: CPT

## 2021-08-28 PROCEDURE — 80053 COMPREHEN METABOLIC PANEL: CPT

## 2021-08-28 PROCEDURE — 84100 ASSAY OF PHOSPHORUS: CPT

## 2021-08-28 PROCEDURE — 36415 COLL VENOUS BLD VENIPUNCTURE: CPT

## 2021-08-28 PROCEDURE — 93005 ELECTROCARDIOGRAM TRACING: CPT

## 2021-08-28 PROCEDURE — 80048 BASIC METABOLIC PNL TOTAL CA: CPT

## 2021-08-28 PROCEDURE — 99285 EMERGENCY DEPT VISIT HI MDM: CPT

## 2021-08-28 PROCEDURE — 74011250637 HC RX REV CODE- 250/637: Performed by: EMERGENCY MEDICINE

## 2021-08-28 PROCEDURE — 85025 COMPLETE CBC W/AUTO DIFF WBC: CPT

## 2021-08-28 PROCEDURE — 71045 X-RAY EXAM CHEST 1 VIEW: CPT

## 2021-08-28 PROCEDURE — 84484 ASSAY OF TROPONIN QUANT: CPT

## 2021-08-28 PROCEDURE — 83735 ASSAY OF MAGNESIUM: CPT

## 2021-08-28 RX ORDER — FUROSEMIDE 40 MG/1
80 TABLET ORAL DAILY
Status: DISCONTINUED | OUTPATIENT
Start: 2021-08-29 | End: 2021-08-29

## 2021-08-28 RX ORDER — ASPIRIN 81 MG/1
81 TABLET ORAL DAILY
Status: DISCONTINUED | OUTPATIENT
Start: 2021-08-29 | End: 2021-08-30 | Stop reason: HOSPADM

## 2021-08-28 RX ORDER — INSULIN LISPRO 100 [IU]/ML
INJECTION, SOLUTION INTRAVENOUS; SUBCUTANEOUS
Status: DISCONTINUED | OUTPATIENT
Start: 2021-08-28 | End: 2021-08-30 | Stop reason: HOSPADM

## 2021-08-28 RX ORDER — LISINOPRIL 10 MG/1
40 TABLET ORAL DAILY
Status: DISCONTINUED | OUTPATIENT
Start: 2021-08-29 | End: 2021-08-29

## 2021-08-28 RX ORDER — PRAVASTATIN SODIUM 20 MG/1
40 TABLET ORAL DAILY
Status: DISCONTINUED | OUTPATIENT
Start: 2021-08-29 | End: 2021-08-30 | Stop reason: HOSPADM

## 2021-08-28 RX ORDER — MAGNESIUM SULFATE 100 %
4 CRYSTALS MISCELLANEOUS AS NEEDED
Status: DISCONTINUED | OUTPATIENT
Start: 2021-08-28 | End: 2021-08-30 | Stop reason: HOSPADM

## 2021-08-28 RX ORDER — ONDANSETRON 4 MG/1
4 TABLET, ORALLY DISINTEGRATING ORAL
Status: DISCONTINUED | OUTPATIENT
Start: 2021-08-28 | End: 2021-08-30 | Stop reason: HOSPADM

## 2021-08-28 RX ORDER — CLOPIDOGREL BISULFATE 75 MG/1
75 TABLET ORAL DAILY
Status: DISCONTINUED | OUTPATIENT
Start: 2021-08-29 | End: 2021-08-30 | Stop reason: HOSPADM

## 2021-08-28 RX ORDER — MUPIROCIN 20 MG/G
OINTMENT TOPICAL DAILY
Status: DISCONTINUED | OUTPATIENT
Start: 2021-08-28 | End: 2021-08-30 | Stop reason: HOSPADM

## 2021-08-28 RX ORDER — ONDANSETRON 2 MG/ML
4 INJECTION INTRAMUSCULAR; INTRAVENOUS
Status: DISCONTINUED | OUTPATIENT
Start: 2021-08-28 | End: 2021-08-30 | Stop reason: HOSPADM

## 2021-08-28 RX ORDER — OXYCODONE AND ACETAMINOPHEN 10; 325 MG/1; MG/1
1 TABLET ORAL
Status: DISCONTINUED | OUTPATIENT
Start: 2021-08-28 | End: 2021-08-30 | Stop reason: HOSPADM

## 2021-08-28 RX ORDER — POLYETHYLENE GLYCOL 3350 17 G/17G
17 POWDER, FOR SOLUTION ORAL DAILY PRN
Status: DISCONTINUED | OUTPATIENT
Start: 2021-08-28 | End: 2021-08-30 | Stop reason: HOSPADM

## 2021-08-28 RX ORDER — ACETAMINOPHEN 325 MG/1
650 TABLET ORAL
Status: DISCONTINUED | OUTPATIENT
Start: 2021-08-28 | End: 2021-08-30 | Stop reason: HOSPADM

## 2021-08-28 RX ORDER — OXYCODONE AND ACETAMINOPHEN 10; 325 MG/1; MG/1
1 TABLET ORAL ONCE
Status: COMPLETED | OUTPATIENT
Start: 2021-08-28 | End: 2021-08-28

## 2021-08-28 RX ORDER — DEXTROSE 50 % IN WATER (D50W) INTRAVENOUS SYRINGE
12.5-25 AS NEEDED
Status: DISCONTINUED | OUTPATIENT
Start: 2021-08-28 | End: 2021-08-30 | Stop reason: HOSPADM

## 2021-08-28 RX ORDER — AMLODIPINE BESYLATE 5 MG/1
10 TABLET ORAL DAILY
Status: DISCONTINUED | OUTPATIENT
Start: 2021-08-29 | End: 2021-08-29

## 2021-08-28 RX ORDER — SODIUM CHLORIDE 0.9 % (FLUSH) 0.9 %
5-40 SYRINGE (ML) INJECTION AS NEEDED
Status: DISCONTINUED | OUTPATIENT
Start: 2021-08-28 | End: 2021-08-30 | Stop reason: HOSPADM

## 2021-08-28 RX ORDER — CARVEDILOL 6.25 MG/1
6.25 TABLET ORAL 2 TIMES DAILY WITH MEALS
Status: DISCONTINUED | OUTPATIENT
Start: 2021-08-29 | End: 2021-08-30 | Stop reason: HOSPADM

## 2021-08-28 RX ORDER — LEVOTHYROXINE SODIUM 50 UG/1
50 TABLET ORAL
Status: DISCONTINUED | OUTPATIENT
Start: 2021-08-29 | End: 2021-08-30 | Stop reason: HOSPADM

## 2021-08-28 RX ORDER — SEVELAMER CARBONATE 800 MG/1
2400 TABLET, FILM COATED ORAL
Status: DISCONTINUED | OUTPATIENT
Start: 2021-08-29 | End: 2021-08-30 | Stop reason: HOSPADM

## 2021-08-28 RX ORDER — ACETAMINOPHEN 650 MG/1
650 SUPPOSITORY RECTAL
Status: DISCONTINUED | OUTPATIENT
Start: 2021-08-28 | End: 2021-08-30 | Stop reason: HOSPADM

## 2021-08-28 RX ORDER — CHLORTHALIDONE 25 MG/1
25 TABLET ORAL DAILY
Status: DISCONTINUED | OUTPATIENT
Start: 2021-08-29 | End: 2021-08-29

## 2021-08-28 RX ORDER — PANTOPRAZOLE SODIUM 40 MG/1
40 TABLET, DELAYED RELEASE ORAL
Status: DISCONTINUED | OUTPATIENT
Start: 2021-08-29 | End: 2021-08-30 | Stop reason: HOSPADM

## 2021-08-28 RX ORDER — SODIUM CHLORIDE 0.9 % (FLUSH) 0.9 %
5-40 SYRINGE (ML) INJECTION EVERY 8 HOURS
Status: DISCONTINUED | OUTPATIENT
Start: 2021-08-28 | End: 2021-08-30 | Stop reason: HOSPADM

## 2021-08-28 RX ADMIN — OXYCODONE AND ACETAMINOPHEN 1 TABLET: 10; 325 TABLET ORAL at 18:25

## 2021-08-28 NOTE — ED TRIAGE NOTES
Patient arrives to the ED in a wheelchair with chief complaint of LUE fistula access problem. Patient was seen at [facility] on 8/27/21 and they were not able to access her fistula and recommended for her to come here for a temporary dialysis port. Patient gets dialysis T, Th, Sat. Last dialysis session 8/24/21.

## 2021-08-28 NOTE — ED NOTES
Bedside shift change report given to [address] (oncoming nurse) by Ivan Castaneda (offgoing nurse). Report included the following information SBAR, ED Summary, Procedure Summary and Recent Results.

## 2021-08-28 NOTE — Clinical Note
Patient Class[de-identified] OBSERVATION [104]   Type of Bed: Medical [8]   Cardiac Monitoring Required?: No   Reason for Observation: missed HD   Admitting Diagnosis: Hypervolemia [275040]   Admitting Physician: Saray House [[id number]]   Attending Physician: Saray House [[id number]]

## 2021-08-28 NOTE — DISCHARGE INSTRUCTIONS
-Go to dialysis center tomorrow  -If they cannot access your fistula, go to [facility] where they can emergently dialyze you and place a temporary dialysis port  -If you cannot get to either place, come back to the closest emergency department

## 2021-08-28 NOTE — ED PROVIDER NOTES
80-year-old female with history of hypertension, congestive heart failure, end-stage renal disease on hemodialysis, coronary disease, diabetes presents to the emergency department with nonfunctioning dialysis access. She has a fistula in her left upper extremity which ceased working several days ago. She last dialysis 4 days ago. She went to another emergency department yesterday and was advised to come to [facility] for placement of temporary dialysis catheter today. The history is provided by the patient and medical records. Vascular Access Problem   This is a new problem. The current episode started more than 2 days ago. The problem occurs constantly. The problem has not changed since onset. The patient is experiencing no pain. Pertinent negatives include no numbness, full range of motion, no stiffness, no tingling, no itching, no back pain and no neck pain. There has been no history of extremity trauma.         Past Medical History:   Diagnosis Date    Acid reflux 11/11/2020    Acute renal failure ([address] 11/11/2020    Benign essential HTN 11/11/2020    Chronic congestive heart failure ([address] 11/11/2020    Chronic kidney disease     Constipation 11/11/2020    Diabetes ([address]     Heart disease 11/11/2020    Hemodialysis access site with mature fistula (HCC)     left arm    Hx of colonic polyp     Hypothyroidism 11/11/2020    MI (myocardial infarction) ([address]     OLD MI    Spasm 11/11/2020    Stage 4 chronic kidney disease ([address] 11/11/2020    Type II diabetes mellitus ([address] 11/11/2020       Past Surgical History:   Procedure Laterality Date    HX APPENDECTOMY      HX CHOLECYSTECTOMY      HX COLONOSCOPY  06/16/2015    RECTAL POLYP    HX COLONOSCOPY  03/25/2013    EGD AND COLONOSCOPY    HX COLONOSCOPY  10/30/2019    COLONOSCOPY-CECAL POLYP, NUMEROUS RECTAL POLYPS    HX GI  10/08/2019    EGD - BX-GERD, BLOATING, ESOPHAGITIS    HX GI  6-16-2-15    EGD    HX GI  03/25/2013    EGD    HX GYN HYSTERECTOMY    HX ORTHOPAEDIC      R LEG AMPUTATION    AR CARDIAC SURG PROCEDURE UNLIST  2012, 2015    STENT PLACEMENT         Family History:   Problem Relation Age of Onset    Diabetes Other     Lung Disease Other     COPD Other        Social History     Socioeconomic History    Marital status:      Spouse name: Not on file    Number of children: Not on file    Years of education: Not on file    Highest education level: Not on file   Occupational History    Not on file   Tobacco Use    Smoking status: Current Every Day Smoker     Packs/day: 0.50    Smokeless tobacco: Never Used   Substance and Sexual Activity    Alcohol use: Never    Drug use: Never    Sexual activity: Not on file   Other Topics Concern    Not on file   Social History Narrative    Not on file     Social Determinants of Health     Financial Resource Strain:     Difficulty of Paying Living Expenses:    Food Insecurity:     Worried About [address] in the Last Year:     [employer] in the Last Year:    Transportation Needs:     Lack of Transportation (Medical):  Lack of Transportation (Non-Medical):    Physical Activity:     Days of Exercise per Week:     Minutes of Exercise per Session:    Stress:     Feeling of Stress :    Social Connections:     Frequency of Communication with Friends and Family:     Frequency of Social Gatherings with Friends and Family:     Attends Mu-ism Services:     Active Member of Clubs or Organizations:     Attends Club or Organization Meetings:     Marital Status:    Intimate Partner Violence:     Fear of Current or Ex-Partner:     Emotionally Abused:     Physically Abused:     Sexually Abused: ALLERGIES: Gabapentin, Codeine, Heparin, Lovenox [enoxaparin], Lyrica [pregabalin], Motrin [ibuprofen], Reglan [metoclopramide hcl], and Vioxx [rofecoxib]    Review of Systems   Constitutional: Positive for fatigue. Negative for fever.    HENT: Negative for sneezing and sore throat. Respiratory: Positive for shortness of breath. Negative for cough. Cardiovascular: Negative for chest pain and leg swelling. Gastrointestinal: Negative for abdominal pain, diarrhea, nausea and vomiting. Genitourinary: Negative for difficulty urinating and dysuria. Musculoskeletal: Negative for arthralgias, back pain, myalgias, neck pain and stiffness. Skin: Negative for color change, itching and rash. Neurological: Positive for weakness. Negative for tingling, numbness and headaches. Psychiatric/Behavioral: Negative for agitation and behavioral problems. Vitals:    08/28/21 1400   BP: (!) 194/75   Pulse: 70   Temp: 98.4 °F (36.9 °C)   SpO2: 97%            Physical Exam  Vitals and nursing note reviewed. Constitutional:       General: She is not in acute distress. Appearance: She is well-developed. She is obese. She is ill-appearing (Chronically ill-appearing). She is not toxic-appearing or diaphoretic. HENT:      Head: Normocephalic and atraumatic. Nose: Nose normal.      Mouth/Throat:      Mouth: Mucous membranes are moist.      Pharynx: Oropharynx is clear. Eyes:      Extraocular Movements: Extraocular movements intact. Conjunctiva/sclera: Conjunctivae normal.      Pupils: Pupils are equal, round, and reactive to light. Cardiovascular:      Rate and Rhythm: Normal rate and regular rhythm. Pulses: Normal pulses. Heart sounds: Normal heart sounds. Pulmonary:      Effort: Pulmonary effort is normal. No respiratory distress. Breath sounds: Normal breath sounds. No wheezing. Chest:      Chest wall: No tenderness. Abdominal:      General: Abdomen is flat. There is no distension. Palpations: Abdomen is soft. Tenderness: There is no abdominal tenderness. There is no guarding or rebound. Musculoskeletal:         General: Signs of injury (Left great toe) present. No swelling, tenderness or deformity.  Normal range of motion. Cervical back: Normal range of motion and neck supple. No rigidity. No muscular tenderness. Left lower leg: Edema present. Comments: Right lower extremity amputation. Nonfunctioning dialysis fistula in the left upper extremity without thrill or bruit   Skin:     General: Skin is warm and dry. Capillary Refill: Capillary refill takes less than 2 seconds. Neurological:      General: No focal deficit present. Mental Status: She is alert and oriented to person, place, and time. Psychiatric:         Mood and Affect: Mood normal.         Behavior: Behavior normal.          MDM  Number of Diagnoses or Management Options  Diagnosis management comments: 67-year-old female presents as above after having missed dialysis for several days. Hypervolemic and hypertensive without hyperkalemia or hypoxia. After discussion with interventional radiology and nephrology plan for admission for placement of a more permanent dialysis access solution. Plan for urgent dialysis tomorrow morning after catheter is placed. Amount and/or Complexity of Data Reviewed  Clinical lab tests: reviewed  Tests in the radiology section of CPT®: reviewed  Tests in the medicine section of CPT®: reviewed  Decide to obtain previous medical records or to obtain history from someone other than the patient: yes      ED Course as of Aug 28 1958   Sat Aug 28, 2021   1607 ED EKG interpretation:Rhythm: normal sinus rhythm. Rate (approx.): 73. Axis: normal.  ST segment:  No concerning ST elevations or depressions. This EKG was interpreted by Ollie Flores MD,ED Provider. [JM]   1927 Discussed with , this is the patient's nephrologist in [place]. He agrees with placement of Ricky cath and urgent dialysis. [JM]   1938 Discussed with Dr. Angela Roth, nephrology, will place dialysis orders for tomorrow after catheter is placed.   Interventional radiology will be able to place dialysis catheter tomorrow morning. No emergent indications for dialysis this evening. [JM]      ED Course User Index  [JM] Amber Mauricio MD       Procedures      Perfect Serve Consult for Admission  8:00 PM    ED Room Number: R38/R38  Patient Name and age:  Gabriel Berman 80 y.o.  female  Working Diagnosis:   1. Other hypervolemia    2. End stage renal disease ([facility] [address]    3. Hypertension, unspecified type        COVID-19 Suspicion:  no  Sepsis present:  no  Reassessment needed: N/A  Code Status:  Full Code  Readmission: no  Isolation Requirements:  no  Recommended Level of Care:  telemetry  Department:[facility] ED - 21   Other: Clotted dialysis fistula. Discussed with nephrology and interventional radiology. Plan for dialysis access placement in the morning and dialysis.

## 2021-08-28 NOTE — CONSULTS
79 [place] she missed several HD Txs due to clotted access. Patient's K is normal. She has significant azotemia  IR was consulted by ER attending  Patient will need a temp HD access. As soon as she has a catheter placed she will be dialyzed.   Orders are in

## 2021-08-28 NOTE — ED PROVIDER NOTES
EMERGENCY DEPARTMENT HISTORY AND PHYSICAL EXAM      Date: 8/27/2021  Patient Name: Shilpi Jenkins    History of Presenting Illness     Chief Complaint   Patient presents with    Vascular Access Problem       History Provided By: Patient    HPI: Shilpi Jenkins, 80 y.o. female with a past medical history significant Chronic kidney disease, on dialysis, diabetes, CHF, hypertension, MI presents to the ED with cc of feeling like her dialysis fistula is clogged. Patient is is Tuesday Thursday Saturday dialysis, last dialyzed on Tuesday. Patient reports her fistula worked on Tuesday and she was able to complete her treatment. Patient just feels that her flow is not as strong as it usually is. Patient does not have any other complaints. She denies numbness, tingling, bleeding, chest pain, shortness of breath, nausea, vomiting, diarrhea. There are no other complaints, changes, or physical findings at this time. PCP: Unknown (Inactive)    Current Outpatient Medications   Medication Sig Dispense Refill    naloxone (Narcan) 4 mg/actuation nasal spray Use 1 spray intranasally, then discard. Repeat with new spray every 2 min as needed for opioid overdose symptoms, alternating nostrils. 1 Each 0    losartan (COZAAR) 100 mg tablet Take 100 mg by mouth daily.  amLODIPine (Norvasc) 5 mg tablet Take 5 mg by mouth daily.  pantoprazole (PROTONIX) 40 mg tablet TAKE ONE tablet DAILY 30 Tablet 5    dicyclomine (BENTYL) 10 mg capsule Take 1 Cap by mouth three (3) times daily as needed for Abdominal Cramps. Indications: irritable colon 90 Cap 3    diclofenac (Voltaren) 1 % gel Voltaren 1 % topical gel      aspirin delayed-release 81 mg tablet Take 81 mg by mouth.       Accu-Chek Marcela Plus test strp strip USE TO CHECK BLOOD SUGAR THREE TIMES DAILY      Accu-Chek Guide Glucose Meter misc USE TO TEST BLOOD SUGAR THREE TIMES DAILY      carvediloL (COREG) 6.25 mg tablet carvedilol 6.25 mg tablet      cholecalciferol (VITAMIN D3) (50,000 UNITS /1250 MCG) capsule cholecalciferol (vitamin D3) 1,250 mcg (50,000 unit) capsule   TAKE ONE CAPSULE BY MOUTH ONCE A WEEK FOR 8 WEEKS      clopidogreL (PLAVIX) 75 mg tab 1 TABLET BY MOUTH EVERY DAY      colchicine (Colcrys) 0.6 mg tablet Colcrys 0.6 mg tablet      hydrALAZINE (APRESOLINE) 100 mg tablet Take 100 mg by mouth daily.  insulin aspart U-100 (NovoLOG U-100 Insulin aspart) 100 unit/mL injection Novolog U-100 Insulin aspart 100 unit/mL subcutaneous solution      insulin detemir U-100 (Levemir U-100 Insulin) 100 unit/mL injection Levemir U-100 Insulin 100 unit/mL subcutaneous solution      isosorbide mononitrate ER (IMDUR) 30 mg tablet Take 60 mg by mouth.  Accu-Chek Fastclix Lancet Drum misc CHECK BLOOD SUGAR THREE TIMES DAILY      levothyroxine (SYNTHROID) 50 mcg tablet TAKE 1 TABLET BY MOUTH IN THE MORNING ON AN EMPTY STOMACH ONCE DAILY      lidocaine-prilocaine (EMLA) topical cream APPLY SMALL AMOUNT TO ACCESS SITE (AVF) 1 TO 2 HOURS BEFORE DIALYSIS. COVER WITH OCCLUSIVE DRESSING (SARAN WRAP) THREE DAYS A WEEK (3 TIMES      OxyCONTIN 10 mg ER tablet TAKE ONE TABLET BY MOUTH EVERY TWELVE HOURS      oxyCODONE-acetaminophen (PERCOCET 7.5) 7.5-325 mg per tablet TAKE ONE TABLET BY MOUTH THREE TIMES DAILY AS NEEDED      pravastatin (PRAVACHOL) 40 mg tablet pravastatin 40 mg tablet      Renvela 800 mg tab tab TAKE 3 TABLETS BY MOUTH THREE TIMES DAILY WITH MEALS AND 2 TABLETS WITH SNACKS      silver sulfADIAZINE (SILVADENE) 1 % topical cream APPLY TO AFFECTED AREA ONCE DAILY      linaCLOtide (Linzess) 145 mcg cap capsule Take 1 Cap by mouth Daily (before breakfast).  Indications: chronic idiopathic constipation 30 Cap 3       Past History     Past Medical History:  Past Medical History:   Diagnosis Date    Acid reflux 11/11/2020    Acute renal failure ([facility] [address] 11/11/2020    Benign essential HTN 11/11/2020    Chronic congestive heart failure ([facility] [address] 11/11/2020    Chronic kidney disease     Constipation 11/11/2020    Diabetes ([facility] [address]     Heart disease 11/11/2020    Hemodialysis access site with mature fistula (HCC)     left arm    Hx of colonic polyp     Hypothyroidism 11/11/2020    MI (myocardial infarction) ([facility] [address]     OLD MI    Spasm 11/11/2020    Stage 4 chronic kidney disease ([facility] [address] 11/11/2020    Type II diabetes mellitus ([facility] [address] 11/11/2020       Past Surgical History:  Past Surgical History:   Procedure Laterality Date    HX APPENDECTOMY      HX CHOLECYSTECTOMY      HX COLONOSCOPY  06/16/2015    RECTAL POLYP    HX COLONOSCOPY  03/25/2013    EGD AND COLONOSCOPY    HX COLONOSCOPY  10/30/2019    COLONOSCOPY-CECAL POLYP, NUMEROUS RECTAL POLYPS    HX GI  10/08/2019    EGD - BX-GERD, BLOATING, ESOPHAGITIS    HX GI  6-16-2-15    EGD    HX GI  03/25/2013    EGD    HX GYN      HYSTERECTOMY    HX ORTHOPAEDIC      R LEG AMPUTATION    IR INSERT TUNL CVC W/O PORT OVER 5 YR  8/29/2021    IR INSERT TUNL CVC W/O PORT OVER 5 YR  8/30/2021    [place] CARDIAC SURG PROCEDURE UNLIST  2012, 2015    STENT PLACEMENT       Family History:  Family History   Problem Relation Age of Onset    Diabetes Other     Lung Disease Other     COPD Other        Social History:  Social History     Tobacco Use    Smoking status: Current Every Day Smoker     Packs/day: 0.50    Smokeless tobacco: Never Used   Substance Use Topics    Alcohol use: Never    Drug use: Never       Allergies: Allergies   Allergen Reactions    Gabapentin Swelling    Codeine Unknown (comments)     TYLENOL # 3    Heparin Unknown (comments)     SAID MADE HER LOSE HER LEG    Lovenox [Enoxaparin] Unknown (comments)    Lyrica [Pregabalin] Unknown (comments)    Motrin [Ibuprofen] Unknown (comments)    Reglan [Metoclopramide Hcl] Unknown (comments)    Vioxx [Rofecoxib] Unknown (comments)         Review of Systems   Review of Systems   Constitutional: Negative for activity change, chills and fever. HENT: Negative for congestion, ear pain, rhinorrhea, sneezing and sore throat. Eyes: Negative for pain and visual disturbance. Respiratory: Negative for cough and shortness of breath. Cardiovascular: Negative for chest pain. Gastrointestinal: Negative for abdominal pain, diarrhea, nausea and vomiting. Genitourinary: Negative for dysuria and hematuria. Musculoskeletal: Negative for gait problem. Skin: Negative for rash. Neurological: Negative for speech difficulty, weakness and headaches. Psychiatric/Behavioral: The patient is not nervous/anxious. All other systems reviewed and are negative. Physical Exam   Physical Exam  Vitals and nursing note reviewed. Constitutional:       General: She is not in acute distress. Appearance: Normal appearance. She is not toxic-appearing. HENT:      Head: Normocephalic and atraumatic. Nose: Nose normal.      Mouth/Throat:      Mouth: Mucous membranes are moist.   Eyes:      Extraocular Movements: Extraocular movements intact. Conjunctiva/sclera: Conjunctivae normal.      Pupils: Pupils are equal, round, and reactive to light. Cardiovascular:      Rate and Rhythm: Normal rate. Pulses: Normal pulses. Heart sounds: Normal heart sounds. Pulmonary:      Effort: Pulmonary effort is normal. No tachypnea or respiratory distress. Breath sounds: Normal breath sounds. No rales. Abdominal:      Tenderness: There is no abdominal tenderness. Musculoskeletal:         General: No deformity or signs of injury. Normal range of motion. Cervical back: Normal range of motion. Right Lower Extremity: Right leg is amputated below knee. Feet:      Left foot:      Skin integrity: Ulcer present. Comments: Left great toe ulcer with dressing in place (patient does not want her bandage undressed as she states this is not why she is here)  Skin:     General: Skin is warm and dry.       Capillary Refill: Capillary refill takes less than 2 seconds. Findings: No rash. Comments: Left upper arm fistula, decreased palpable thrill   Neurological:      General: No focal deficit present. Mental Status: She is alert and oriented to person, place, and time. Cranial Nerves: No cranial nerve deficit. Psychiatric:         Mood and Affect: Mood normal.         Diagnostic Study Results     Labs -     Recent Results (from the past 200 hour(s))   CBC WITH AUTOMATED DIFF    Collection Time: 08/27/21  9:25 PM   Result Value Ref Range    WBC 9.5 3.6 - 11.0 K/uL    RBC 3.27 (L) 3.80 - 5.20 M/uL    HGB 10.7 (L) 11.5 - 16.0 g/dL    HCT 33.2 (L) 35.0 - 47.0 %    .5 (H) 80.0 - 99.0 FL    MCH 32.7 26.0 - 34.0 PG    MCHC 32.2 30.0 - 36.5 g/dL    RDW 16.2 (H) 11.5 - 14.5 %    PLATELET 734 072 - 662 K/uL    MPV 9.7 8.9 - 12.9 FL    NRBC 0.0 0.0  WBC    ABSOLUTE NRBC 0.00 0.00 - 0.01 K/uL    NEUTROPHILS 70 32 - 75 %    LYMPHOCYTES 13 12 - 49 %    MONOCYTES 11 5 - 13 %    EOSINOPHILS 4 0 - 7 %    BASOPHILS 0 0 - 1 %    IMMATURE GRANULOCYTES 2 (H) 0 - 0.5 %    ABS. NEUTROPHILS 6.6 1.8 - 8.0 K/UL    ABS. LYMPHOCYTES 1.2 0.8 - 3.5 K/UL    ABS. MONOCYTES 1.1 (H) 0.0 - 1.0 K/UL    ABS. EOSINOPHILS 0.4 0.0 - 0.4 K/UL    ABS. BASOPHILS 0.0 0.0 - 0.1 K/UL    ABS. IMM.  GRANS. 0.2 (H) 0.00 - 0.04 K/UL    DF AUTOMATED     METABOLIC PANEL, BASIC    Collection Time: 08/28/21 12:35 AM   Result Value Ref Range    Sodium 134 (L) 136 - 145 mmol/L    Potassium 4.2 3.5 - 5.1 mmol/L    Chloride 100 97 - 108 mmol/L    CO2 22 21 - 32 mmol/L    Anion gap 12 5 - 15 mmol/L    Glucose 155 (H) 65 - 100 mg/dL    BUN 49 (H) 6 - 20 mg/dL    Creatinine 12.60 (H) 0.55 - 1.02 mg/dL    BUN/Creatinine ratio 4 (L) 12 - 20      GFR est AA 4 (L) >60 ml/min/1.73m2    GFR est non-AA 3 (L) >60 ml/min/1.73m2    Calcium 9.4 8.5 - 10.1 mg/dL   EKG, 12 LEAD, INITIAL    Collection Time: 08/28/21  3:52 PM   Result Value Ref Range    Ventricular Rate 73 BPM    Atrial Rate 73 BPM P-R Interval 136 ms    QRS Duration 100 ms    Q-T Interval 392 ms    QTC Calculation (Bezet) 431 ms    Calculated P Axis 42 degrees    Calculated R Axis 18 degrees    Calculated T Axis 28 degrees    Diagnosis       Sinus rhythm with premature supraventricular complexes  Otherwise normal ECG  No previous ECGs available  Confirmed by Hima Mckinley M.D., Kay Vipul ([id number]) on 8/29/2021 2:16:56 PM     SAMPLES BEING HELD    Collection Time: 08/28/21  5:41 PM   Result Value Ref Range    SAMPLES BEING HELD 1RED 1LAV 1BLU 1PST     COMMENT        Add-on orders for these samples will be processed based on acceptable specimen integrity and analyte stability, which may vary by analyte. PHOSPHORUS    Collection Time: 08/28/21  5:41 PM   Result Value Ref Range    Phosphorus 3.4 2.6 - 4.7 MG/DL   MAGNESIUM    Collection Time: 08/28/21  5:41 PM   Result Value Ref Range    Magnesium 2.1 1.6 - 2.4 mg/dL   CBC WITH AUTOMATED DIFF    Collection Time: 08/28/21  5:41 PM   Result Value Ref Range    WBC 9.4 3.6 - 11.0 K/uL    RBC 3.18 (L) 3.80 - 5.20 M/uL    HGB 10.3 (L) 11.5 - 16.0 g/dL    HCT 32.9 (L) 35.0 - 47.0 %    .5 (H) 80.0 - 99.0 FL    MCH 32.4 26.0 - 34.0 PG    MCHC 31.3 30.0 - 36.5 g/dL    RDW 16.3 (H) 11.5 - 14.5 %    PLATELET 657 037 - 283 K/uL    MPV 9.6 8.9 - 12.9 FL    NRBC 0.0 0  WBC    ABSOLUTE NRBC 0.00 0.00 - 0.01 K/uL    NEUTROPHILS 65 32 - 75 %    LYMPHOCYTES 15 12 - 49 %    MONOCYTES 14 (H) 5 - 13 %    EOSINOPHILS 3 0 - 7 %    BASOPHILS 1 0 - 1 %    IMMATURE GRANULOCYTES 2 (H) 0.0 - 0.5 %    ABS. NEUTROPHILS 6.1 1.8 - 8.0 K/UL    ABS. LYMPHOCYTES 1.4 0.8 - 3.5 K/UL    ABS. MONOCYTES 1.3 (H) 0.0 - 1.0 K/UL    ABS. EOSINOPHILS 0.3 0.0 - 0.4 K/UL    ABS. BASOPHILS 0.1 0.0 - 0.1 K/UL    ABS. IMM.  GRANS. 0.2 (H) 0.00 - 0.04 K/UL    DF AUTOMATED     METABOLIC PANEL, COMPREHENSIVE    Collection Time: 08/28/21  5:41 PM   Result Value Ref Range    Sodium 133 (L) 136 - 145 mmol/L    Potassium 4.3 3.5 - 5.1 mmol/L Chloride 102 97 - 108 mmol/L    CO2 21 21 - 32 mmol/L    Anion gap 10 5 - 15 mmol/L    Glucose 154 (H) 65 - 100 mg/dL    BUN 53 (H) 6 - 20 MG/DL    Creatinine 14.50 (H) 0.55 - 1.02 MG/DL    BUN/Creatinine ratio 4 (L) 12 - 20      GFR est AA 3 (L) >60 ml/min/1.73m2    GFR est non-AA 3 (L) >60 ml/min/1.73m2    Calcium 10.0 8.5 - 10.1 MG/DL    Bilirubin, total 0.3 0.2 - 1.0 MG/DL    ALT (SGPT) 20 12 - 78 U/L    AST (SGOT) 30 15 - 37 U/L    Alk. phosphatase 102 45 - 117 U/L    Protein, total 6.9 6.4 - 8.2 g/dL    Albumin 2.3 (L) 3.5 - 5.0 g/dL    Globulin 4.6 (H) 2.0 - 4.0 g/dL    A-G Ratio 0.5 (L) 1.1 - 2.2     TROPONIN I    Collection Time: 08/28/21  5:41 PM   Result Value Ref Range    Troponin-I, Qt. <0.05 <0.05 ng/mL   GLUCOSE, POC    Collection Time: 08/29/21 12:11 AM   Result Value Ref Range    Glucose (POC) 175 (H) 65 - 117 mg/dL    Performed by Saad Garcia    HEP B SURFACE AG    Collection Time: 08/29/21  2:43 AM   Result Value Ref Range    Hepatitis B surface Ag <0.10 Index    Hep B surface Ag Interp. Negative NEG     HEP B SURFACE AB    Collection Time: 08/29/21  2:43 AM   Result Value Ref Range    Hepatitis B surface Ab 200.88 mIU/mL    Hep B surface Ab Interp. REACTIVE (A) NR     SAMPLES BEING HELD    Collection Time: 08/29/21  2:43 AM   Result Value Ref Range    SAMPLES BEING HELD 1pst,1blu,1lav     COMMENT        Add-on orders for these samples will be processed based on acceptable specimen integrity and analyte stability, which may vary by analyte.    GLUCOSE, POC    Collection Time: 08/29/21  9:18 PM   Result Value Ref Range    Glucose (POC) 243 (H) 65 - 117 mg/dL    Performed by Catie KIMBLE    GLUCOSE, POC    Collection Time: 08/30/21  6:21 AM   Result Value Ref Range    Glucose (POC) 166 (H) 65 - 117 mg/dL    Performed by Armando Franks    GLUCOSE, POC    Collection Time: 08/30/21 12:03 PM   Result Value Ref Range    Glucose (POC) 169 (H) 65 - 117 mg/dL    Performed by SHADI JAMES(CON) GLUCOSE, POC    Collection Time: 08/30/21  5:05 PM   Result Value Ref Range    Glucose (POC) 211 (H) 65 - 117 mg/dL    Performed by [facility](RISA)        Radiologic Studies -   XR Results (most recent):  Results from Hospital Encounter encounter on 08/28/21    XR CHEST PORT    Narrative  Indication: Shortness of breath    Comparison: 8/7/2021    Portable exam of the chest obtained at 2316 demonstrates normal heart size. There is no acute process in the lung fields. The osseous structures are  unremarkable. Impression  No acute process. CT Results  (Last 48 hours)    None            Medical Decision Making and ED Course   I am the first provider for this patient. I reviewed the vital signs, available nursing notes, past medical history, past surgical history, family history and social history. Vital Signs-Reviewed the patient's vital signs. Wt Readings from Last 3 Encounters:   08/30/21 108.9 kg (240 lb)   08/27/21 108.9 kg (240 lb)   10/06/20 113.4 kg (250 lb)     Temp Readings from Last 3 Encounters:   08/30/21 98.7 °F (37.1 °C)   08/29/21 99.1 °F (37.3 °C)   08/27/21 98.5 °F (36.9 °C)     BP Readings from Last 3 Encounters:   08/30/21 (!) 165/53   08/29/21 (!) 156/68   08/28/21 (!) 155/86     Pulse Readings from Last 3 Encounters:   08/30/21 75   08/29/21 71   08/28/21 72       No data found. Records Reviewed: Nursing Notes and Old Medical Records    Provider Notes (Medical Decision Making):       MDM  Number of Diagnoses or Management Options  ESRD on dialysis [facility])  Malfunction of arteriovenous dialysis fistula, initial encounter [facility])  Diagnosis management comments: 78-year-old female presented to the ED for concern for patency of her dialysis fistula. Unable to determine emergently if her fistula is patent however the thrill does seem less palpable. She has missed 1 dialysis treatment. She is in no acute distress, there is no acute pulmonary edema. Her potassium is 4.3.   She does not meet criteria for emergency dialysis and therefore will not be transferred to a hospital for emergent dialysis at this time. She also does not meet criteria for admission. She will go to her dialysis treatment center tomorrow and attempt to have dialysis completed. Her fistula was successfully used just 2 days ago. If she cannot be dialyzed, she has been advised to go to [facility] where she can have emergent dialysis and also have her fistula evaluated for patency and have a temporary dialysis catheter placed. She voices understanding of the plan. She is stable for discharge. Amount and/or Complexity of Data Reviewed  Clinical lab tests: ordered and reviewed  Tests in the radiology section of CPT®: ordered and reviewed  Review and summarize past medical records: yes          ED Course:   Initial assessment performed. The patients presenting problems have been discussed, and they are in agreement with the care plan formulated and outlined with them. I have encouraged them to ask questions as they arise throughout their visit. Consult Note:  2:21 AM  Ellen Galdamez PA-C spoke with Dr. Chidi Stroud,  Specialty: ED Attending  Discussed pt's hx, disposition, and available diagnostic and imaging results. Reviewed care plans. Evaluated the pt, agrees that patient's dialysis fistula has a less palpable thrill than usual.  She suggests calling vascular surgery at [facility] as we cannot do a fistulogram overnight with IR and we cannot place a temporary dialysis catheter overnight because IR is not on-call at this time. Consult Note:  2:51 AM   Ellen Galdamez PA-C spoke with Dr. Seferino Ospina  Specialty: Vascular Surgery at [facility]  Discussed pt's hx, disposition, and available diagnostic and imaging results. Reviewed care plans. Suggest that patient can stay at [facility] for admission and have IR evaluate her in the morning for potential temporary dialysis catheter placement.  he does not see a need to transfer the patient to [facility] at this time. However he would be happy to see the patient in consultation if we felt she needed to be transferred. .    Consult Note:  2:55 AM  Spoke with Dr. Jose Francisco Ma about admitting the patient. She states that we actually do not have IR coverage tomorrow and would not be able to place a temporary dialysis catheter for this patient tomorrow. We do not have an emergent way to evaluate if this patient's fistula is patent. After discussing with Dr. Jose Francisco Ma and Dr. Sean Silva, we have determined the plan of the patient arriving to her dialysis center tomorrow as scheduled and attempting dialysis treatment. If the patient's fistula is unable to be used, she should go to the nearest hospital that can help evaluate her fistula for patency and potentially place emergent dialysis catheter. This would be [facility].  If she cannot get to [facility], and can only get to [facility], she is welcome to come here and we can transfer her to the best facility. Procedures       Ann Vernon PA-C    Procedures     Ann Vernon PA-C        Disposition     Disposition: DC- Adult Discharges: All of the diagnostic tests were reviewed and questions answered. Diagnosis, care plan and treatment options were discussed. The patient understands the instructions and will follow up as directed. The patients results have been reviewed with them. They have been counseled regarding their diagnosis. The patient verbally convey understanding and agreement of the signs, symptoms, diagnosis, treatment and prognosis and additionally agrees to follow up as recommended with their PCP in 24 - 48 hours. They also agree with the care-plan and convey that all of their questions have been answered.   I have also put together some discharge instructions for them that include: 1) educational information regarding their diagnosis, 2) how to care for their diagnosis at home, as well a 3) list of reasons why they would want to return to the ED prior to their follow-up appointment, should their condition change. Discharged    DISCHARGE PLAN:  1. Current Discharge Medication List      CONTINUE these medications which have NOT CHANGED    Details   pantoprazole (PROTONIX) 40 mg tablet TAKE ONE tablet DAILY  Qty: 30 Tablet, Refills: 5    Comments: This prescription was filled on 5/7/2021. Any refills authorized will be placed on file. Associated Diagnoses: Gastroesophageal reflux disease with esophagitis without hemorrhage      dicyclomine (BENTYL) 10 mg capsule Take 1 Cap by mouth three (3) times daily as needed for Abdominal Cramps. Indications: irritable colon  Qty: 90 Cap, Refills: 3    Associated Diagnoses: Bloating      Lancing Device with Lancets (Accu-Chek FastClix Lancing Dev) kit Accu-Chek FastClix Lancing Device   USED TO CHECK BLOOD SUGAR DX:E11.65 3 TIMES A DAY -USING INSULIN 90 DAYS      Insulin Needles, Disposable, (BD Ultra-Fine Mini Pen Needle) 31 gauge x 3/16\" ndle BD Ultra-Fine Mini Pen Needle 31 gauge x 3/16\"      Insulin Needles, Disposable, (Natalie Pen Needle) 32 gauge x 5/32\" ndle BD Ultra-Fine Natalie Pen Needle 32 gauge x 5/32\"      diclofenac (Voltaren) 1 % gel Voltaren 1 % topical gel      amLODIPine (NORVASC) 10 mg tablet amlodipine 10 mg tablet      aspirin delayed-release 81 mg tablet Take 81 mg by mouth.       Accu-Chek Marcela Plus test strp strip USE TO CHECK BLOOD SUGAR THREE TIMES DAILY      Accu-Chek Guide Glucose Meter misc USE TO TEST BLOOD SUGAR THREE TIMES DAILY      carvediloL (COREG) 6.25 mg tablet carvedilol 6.25 mg tablet      chlorthalidone (HYGROTON) 25 mg tablet chlorthalidone 25 mg tablet      cholecalciferol (VITAMIN D3) (50,000 UNITS /1250 MCG) capsule cholecalciferol (vitamin D3) 1,250 mcg (50,000 unit) capsule   TAKE ONE CAPSULE BY MOUTH ONCE A WEEK FOR 8 WEEKS      clopidogreL (PLAVIX) 75 mg tab 1 TABLET BY MOUTH EVERY DAY      colchicine (Colcrys) 0.6 mg tablet Colcrys 0.6 mg tablet      furosemide (LASIX) 40 mg tablet TAKE 2 TABLETS BY MOUTH EVERY DAY      hydrALAZINE (APRESOLINE) 100 mg tablet TAKE ONE TABLET TWICE DAILY ON DIALYSIS DAYS AND THREE TIMES DAILY OTHER DAYS      insulin aspart U-100 (NovoLOG U-100 Insulin aspart) 100 unit/mL injection Novolog U-100 Insulin aspart 100 unit/mL subcutaneous solution      insulin detemir U-100 (Levemir U-100 Insulin) 100 unit/mL injection Levemir U-100 Insulin 100 unit/mL subcutaneous solution      isosorbide mononitrate ER (IMDUR) 30 mg tablet isosorbide mononitrate ER 30 mg tablet,extended release 24 hr      Accu-Chek Fastclix Lancet Drum misc CHECK BLOOD SUGAR THREE TIMES DAILY      levothyroxine (SYNTHROID) 50 mcg tablet TAKE 1 TABLET BY MOUTH IN THE MORNING ON AN EMPTY STOMACH ONCE DAILY      lidocaine-prilocaine (EMLA) topical cream APPLY SMALL AMOUNT TO ACCESS SITE (AVF) 1 TO 2 HOURS BEFORE DIALYSIS. COVER WITH OCCLUSIVE DRESSING (SARAN WRAP) THREE DAYS A WEEK (3 TIMES      lisinopriL (PRINIVIL, ZESTRIL) 40 mg tablet TAKE 1 TABLET BY MOUTH DAILY IF SYSTOLIC >952      OxyCONTIN 10 mg ER tablet TAKE ONE TABLET BY MOUTH EVERY TWELVE HOURS      oxyCODONE-acetaminophen (PERCOCET 7.5) 7.5-325 mg per tablet TAKE ONE TABLET BY MOUTH THREE TIMES DAILY AS NEEDED      pravastatin (PRAVACHOL) 40 mg tablet pravastatin 40 mg tablet      Renvela 800 mg tab tab TAKE 3 TABLETS BY MOUTH THREE TIMES DAILY WITH MEALS AND 2 TABLETS WITH SNACKS      silver sulfADIAZINE (SILVADENE) 1 % topical cream APPLY TO AFFECTED AREA ONCE DAILY      linaCLOtide (Linzess) 145 mcg cap capsule Take 1 Cap by mouth Daily (before breakfast). Indications: chronic idiopathic constipation  Qty: 30 Cap, Refills: 3    Associated Diagnoses: Chronic idiopathic constipation           2.    Follow-up Information     Follow up With Specialties Details Why Contact Filipe Arrington MD Surgery, General and Vascular Surgery Schedule an appointment as soon as possible for a visit  for follow up from ER visit, Monday [address]  [phone]      [facility]   go to hospital tomorrow if fistula not working Ion Moore [address] EMERGENCY DEPT Emergency Medicine  As needed, If symptoms worsen [address] [address]  [phone]        3. Return to ED if worse   4. Discharge Medication List as of 8/28/2021  3:42 AM          Diagnosis     Clinical Impression:   1. Malfunction of arteriovenous dialysis fistula, initial encounter ([facility] [address]    2. ESRD on dialysis [facility])        Attestations:    Amelia Halsted, PA-C    Please note that this dictation was completed with [employer]se[employer], the computer voice recognition software. Quite often unanticipated grammatical, syntax, homophones, and other interpretive errors are inadvertently transcribed by the computer software. Please disregard these errors. Please excuse any errors that have escaped final proofreading. Thank you.

## 2021-08-29 ENCOUNTER — HOSPITAL ENCOUNTER (OUTPATIENT)
Dept: INTERVENTIONAL RADIOLOGY/VASCULAR | Age: 71
Setting detail: OBSERVATION
Discharge: HOME | End: 2021-08-29
Attending: INTERNAL MEDICINE
Payer: MEDICARE

## 2021-08-29 ENCOUNTER — APPOINTMENT (OUTPATIENT)
Dept: GENERAL RADIOLOGY | Age: 71
End: 2021-08-29
Attending: RADIOLOGY
Payer: MEDICARE

## 2021-08-29 VITALS
TEMPERATURE: 99.1 F | DIASTOLIC BLOOD PRESSURE: 68 MMHG | SYSTOLIC BLOOD PRESSURE: 156 MMHG | OXYGEN SATURATION: 100 % | RESPIRATION RATE: 14 BRPM | HEART RATE: 71 BPM

## 2021-08-29 LAB
ATRIAL RATE: 73 BPM
CALCULATED P AXIS, ECG09: 42 DEGREES
CALCULATED R AXIS, ECG10: 18 DEGREES
CALCULATED T AXIS, ECG11: 28 DEGREES
COMMENT, HOLDF: NORMAL
DIAGNOSIS, 93000: NORMAL
GLUCOSE BLD STRIP.AUTO-MCNC: 175 MG/DL (ref 65–117)
GLUCOSE BLD STRIP.AUTO-MCNC: 243 MG/DL (ref 65–117)
HBV SURFACE AB SER QL: REACTIVE
HBV SURFACE AB SER-ACNC: 200.88 MIU/ML
HBV SURFACE AG SER QL: <0.1 INDEX
HBV SURFACE AG SER QL: NEGATIVE
P-R INTERVAL, ECG05: 136 MS
Q-T INTERVAL, ECG07: 392 MS
QRS DURATION, ECG06: 100 MS
QTC CALCULATION (BEZET), ECG08: 431 MS
SAMPLES BEING HELD,HOLD: NORMAL
SERVICE CMNT-IMP: ABNORMAL
SERVICE CMNT-IMP: ABNORMAL
VENTRICULAR RATE, ECG03: 73 BPM

## 2021-08-29 PROCEDURE — 74011636637 HC RX REV CODE- 636/637: Performed by: HOSPITALIST

## 2021-08-29 PROCEDURE — 76942 ECHO GUIDE FOR BIOPSY: CPT

## 2021-08-29 PROCEDURE — G0257 UNSCHED DIALYSIS ESRD PT HOS: HCPCS

## 2021-08-29 PROCEDURE — 2709999900 HC NON-CHARGEABLE SUPPLY

## 2021-08-29 PROCEDURE — 74011250637 HC RX REV CODE- 250/637: Performed by: HOSPITALIST

## 2021-08-29 PROCEDURE — 71045 X-RAY EXAM CHEST 1 VIEW: CPT

## 2021-08-29 PROCEDURE — 36415 COLL VENOUS BLD VENIPUNCTURE: CPT

## 2021-08-29 PROCEDURE — 74011000250 HC RX REV CODE- 250: Performed by: RADIOLOGY

## 2021-08-29 PROCEDURE — C1894 INTRO/SHEATH, NON-LASER: HCPCS

## 2021-08-29 PROCEDURE — 99218 HC RM OBSERVATION: CPT

## 2021-08-29 PROCEDURE — 82962 GLUCOSE BLOOD TEST: CPT

## 2021-08-29 PROCEDURE — C1769 GUIDE WIRE: HCPCS

## 2021-08-29 PROCEDURE — 90935 HEMODIALYSIS ONE EVALUATION: CPT

## 2021-08-29 PROCEDURE — 87340 HEPATITIS B SURFACE AG IA: CPT

## 2021-08-29 PROCEDURE — 74011250637 HC RX REV CODE- 250/637: Performed by: NURSE PRACTITIONER

## 2021-08-29 PROCEDURE — 86706 HEP B SURFACE ANTIBODY: CPT

## 2021-08-29 PROCEDURE — C1752 CATH,HEMODIALYSIS,SHORT-TERM: HCPCS

## 2021-08-29 RX ORDER — LOSARTAN POTASSIUM 50 MG/1
100 TABLET ORAL DAILY
Status: DISCONTINUED | OUTPATIENT
Start: 2021-08-30 | End: 2021-08-30 | Stop reason: HOSPADM

## 2021-08-29 RX ORDER — AMLODIPINE BESYLATE 5 MG/1
5 TABLET ORAL DAILY
Status: DISCONTINUED | OUTPATIENT
Start: 2021-08-30 | End: 2021-08-30 | Stop reason: HOSPADM

## 2021-08-29 RX ORDER — SODIUM CITRATE 4 % (5 ML)
1.4 SYRINGE (ML) MISCELLANEOUS
Status: DISCONTINUED | OUTPATIENT
Start: 2021-08-29 | End: 2021-08-29

## 2021-08-29 RX ORDER — SODIUM CITRATE 4 % (5 ML)
1.1 SYRINGE (ML) MISCELLANEOUS
Status: COMPLETED | OUTPATIENT
Start: 2021-08-29 | End: 2021-08-29

## 2021-08-29 RX ORDER — LIDOCAINE HYDROCHLORIDE 20 MG/ML
20 INJECTION, SOLUTION INFILTRATION; PERINEURAL
Status: COMPLETED | OUTPATIENT
Start: 2021-08-29 | End: 2021-08-29

## 2021-08-29 RX ORDER — HYDRALAZINE HYDROCHLORIDE 50 MG/1
100 TABLET, FILM COATED ORAL DAILY
Status: DISCONTINUED | OUTPATIENT
Start: 2021-08-30 | End: 2021-08-30 | Stop reason: HOSPADM

## 2021-08-29 RX ORDER — AMLODIPINE BESYLATE 5 MG/1
5 TABLET ORAL DAILY
COMMUNITY

## 2021-08-29 RX ORDER — ISOSORBIDE MONONITRATE 60 MG/1
60 TABLET, EXTENDED RELEASE ORAL DAILY
Status: DISCONTINUED | OUTPATIENT
Start: 2021-08-30 | End: 2021-08-30 | Stop reason: HOSPADM

## 2021-08-29 RX ORDER — OXYCODONE HYDROCHLORIDE 5 MG/1
5 TABLET ORAL
Status: DISCONTINUED | OUTPATIENT
Start: 2021-08-29 | End: 2021-08-30 | Stop reason: HOSPADM

## 2021-08-29 RX ORDER — LOSARTAN POTASSIUM 100 MG/1
100 TABLET ORAL DAILY
COMMUNITY

## 2021-08-29 RX ORDER — SODIUM CITRATE 4 % (5 ML)
1.1 SYRINGE (ML) MISCELLANEOUS
Status: DISCONTINUED | OUTPATIENT
Start: 2021-08-29 | End: 2021-08-29

## 2021-08-29 RX ORDER — SODIUM CITRATE 4 % (5 ML)
1.4 SYRINGE (ML) MISCELLANEOUS
Status: DISCONTINUED | OUTPATIENT
Start: 2021-08-29 | End: 2021-09-02 | Stop reason: HOSPADM

## 2021-08-29 RX ADMIN — LIDOCAINE HYDROCHLORIDE 5 ML: 20 INJECTION, SOLUTION INFILTRATION; PERINEURAL at 10:04

## 2021-08-29 RX ADMIN — INSULIN LISPRO 2 UNITS: 100 INJECTION, SOLUTION INTRAVENOUS; SUBCUTANEOUS at 23:03

## 2021-08-29 RX ADMIN — OXYCODONE 5 MG: 5 TABLET ORAL at 19:06

## 2021-08-29 RX ADMIN — Medication 2.5 ML: at 09:00

## 2021-08-29 RX ADMIN — OXYCODONE AND ACETAMINOPHEN 1 TABLET: 10; 325 TABLET ORAL at 00:31

## 2021-08-29 RX ADMIN — OXYCODONE AND ACETAMINOPHEN 1 TABLET: 10; 325 TABLET ORAL at 23:35

## 2021-08-29 RX ADMIN — OXYCODONE AND ACETAMINOPHEN 1 TABLET: 10; 325 TABLET ORAL at 13:42

## 2021-08-29 NOTE — PROCEDURES
Hemodialysis / [phone]    Vitals Pre Post Assessment Pre Post   /65 145/75 LOC mild confusion Mild confusion   HR 72 88 Lungs clear clear   Resp 16 16 Cardiac NSR NSR   Temp 99.1 98.7 Skin Warm / dry Warm / dry   Weight UTO* UTO* Edema trace none   Tele status no no Pain denies denies   *UTO patient on stretcher  Orders   Duration: Start: 2542 End: 6714 Total: 4hrs   Dialyzer: Dialyzer/Set Up Inspection: Betha Citizen (08/29/21 1030)   K Bath: Dialysate K (mEq/L): 2 (08/29/21 1045)   Ca Bath: Dialysate CA (mEq/L): 2.5 (08/29/21 1045)   Na: Dialysate NA (mEq/L): 140 (08/29/21 1045)   Bicarb: Dialysate HCO3 (mEq/L): 35 (08/29/21 1045)   Target Fluid Removal: Goal/Amount of Fluid to Remove (mL): 2000 mL (08/29/21 1030)     Access   Type & Location: New R IJ temp cath / Placement confirmed prior to use   Comments:  Prepped per policy. Dsg dated 0/28 remains clean, dry & intact. No redness or drainage noted at insertion site. Cath flushed with NS only due to heparin listed as allergy.  Secured with sterile Q-Syte and PureHub caps post tx.                                              Labs   HBsAg (Antigen) / date: Neg 8/29/21                                            HBsAb (Antibody) / date: Immune 8/29/21   Source: Epic   Obtained/Reviewed  Critical Results Called HGB   Date Value Ref Range Status   08/28/2021 10.3 (L) 11.5 - 16.0 g/dL Final     Potassium   Date Value Ref Range Status   08/28/2021 4.3 3.5 - 5.1 mmol/L Final     Calcium   Date Value Ref Range Status   08/28/2021 10.0 8.5 - 10.1 MG/DL Final     BUN   Date Value Ref Range Status   08/28/2021 53 (H) 6 - 20 MG/DL Final     Creatinine   Date Value Ref Range Status   08/28/2021 14.50 (H) 0.55 - 1.02 MG/DL Final        Meds Given   Name Dose Route          Adequacy / Fluid    Total Liters Process: 96 liters   Net Fluid Removed: 2kgs      Comments   Time Out Done:   (Time) 1043   Admitting Diagnosis: Clotted MARINA AVG / ESRD   Consent obtained/signed: Informed Consent Verified:  (obtained) (08/29/21 1030)   Machine / RO # Machine Number: [id number] / Quynh Blackwell (08/29/21 1030)   Primary Nurse Rpt Pre: Pérez Flores RN   Primary Nurse Rpt Post: Pérez Flores RN   Pt Education: CVC care   Care Plan: HD as ordered   Pts outpatient clinic: [facility] [place] [address] [facility]     Tx Summary   Comments:  Pt tolerated tx well. Report given to primary nurse. Pt returned to ER38 in stable condition.

## 2021-08-29 NOTE — ED NOTES
Yesterday evening Dr. Alexi Landa in IR was paged twice by the . Dr. Alexi Landa spoke to Dr. Toby Jackson. Per Doctor smallwood's notes, a temporary catheter will be placed this morning and the patient will receive dialysis after its been placed.

## 2021-08-29 NOTE — PROGRESS NOTES
Pt arrives via stretcher, assisted from wheelchair into stretcher, to angio department accompanied by this RN for Jakob Peck dialysis access procedure. All assessments completed and consent was reviewed. Education given was regarding procedure, no sedation, post-procedure care and  management/follow-up. Opportunity for questions was provided and all questions and concerns were addressed.

## 2021-08-29 NOTE — ROUTINE PROCESS
TRANSFER - OUT REPORT:    Verbal report given to [facility], RN(name) on [address]  being transferred to , room 516(unit) for routine progression of care       Report consisted of patients Situation, Background, Assessment and   Recommendations(SBAR). Information from the following report(s) SBAR, ED Summary, [facility] and Recent Results was reviewed with the receiving nurse. Lines:       Opportunity for questions and clarification was provided.       Patient transported with:   [employer]

## 2021-08-29 NOTE — ROUTINE PROCESS
TRANSFER - OUT REPORT:    Verbal report given to [facility] RN(name) on [place] [facility]  being transferred to dialysis unit 531(unit) for routine progression of care       Report consisted of patients Situation, Background, Assessment and   Recommendations(SBAR). Information from the following report(s) SBAR, Procedure Summary and MAR was reviewed with the receiving nurse. Lines:       Opportunity for questions and clarification was provided.       Patient transported with:   Registered Nurse   Wheelchair, pillow and telephone in dialysis room upon arrival to 53- brought from ED

## 2021-08-29 NOTE — ED NOTES
Pt had non-tunneled catheter placed by IR when tunneled catheter was ordered. Pt informed by Ronna Mejia NP that she would be admitted and spend the night, with correct catheter placed in morning. Pt currently in wheelchair actively trying to leave hospital. This RN advised by Ronna Mejia to not allow patient to leave without pulling catheter. Pt refusing to allow RN to remove catheter. Pt and pt's  appear angry and are \"calling their \". NP Ronna Mejia, charge RN, and RN supervisor aware of situation. Pt offered solutions of having line pulled and waiting an hour then leaving or getting bed upstairs for the night.

## 2021-08-29 NOTE — PROGRESS NOTES
Hospitalist Progress Note    NAME: Dru Webster   :  1950   MRN:  [id number]     HPI excerpted from admission H&P:    '71 y.o lady w/ ESRD on HD, DM, HTN, CHF, R AKA, who presents due to her LUE AVF not working. She last had dialysis on . She denies chest pain, she is feeling a little dyspneic. No fever or cough. \"    Assessment / Plan:  Clotted/dysfunctional dialysis access  ESRD   Secondary hyperparathyroidism   - Orders were placed for tunneled HD catheter however, patient received a nontunneled catheter.  - Patient received HD today with the intention of being discharged to home following HD however, given the fact that a nontunneled catheter was placed we will not be able to discharge the patient at this time. - Continue sevelamer 2,400 mg po tid with meals.    - Make npo after midnight for replacement of temporary HD cath with tunneled HD cath. Hyponatremia   - HD as noted above. CAD  HTN  Dyslipidemia   - PTA  meds reviewed by clinical pharmacist and updated. - Continue asa 81 mg po daily. - Continue clopidogrel 75 mg po daily. - Continue amlodipine 5 mg po daily. - Continue carvedilol 6.25 mg po bid. - Continue hydralazine 100 mg pp daily. - Continue isosorbide ER 60 mg po daily. - Continue losartan 100 mg po daily. - Continue pravastatin 40 mg po daily. DM II  Hyperglycemia   - Continue FSBS with SSI correction scale. Anemia of chronic disease  - No tx indicated at this time. Chronic pain syndrome  - PDMP reviewed. - Oxycodone ER 10 mg #56, 28 day supply - last filled 21 (MME 30). - Morphine ER 15 mg #56, 28 day supply - last filled 21 (MME = 30). - Oxycodone/apap 7.5/325 mg #84, 28 day supply -  Last filled 21 (MME - 34). - Triazolam 0.25 mg, #30, 30 day supply - last filled 21.   - I have serious concerns with the fact that the patient is on 2 long acting opioids, short acting opioids (equalling high MME) as well as BZO all in the setting of ESRD. - Will tx only with prn short acting opioids on a prn basis given patient's confusion.  - Start oxycodone IR 5 mg po q4h prn. Code status: Full  Prophylaxis: SCD's  Recommended Disposition: Home w/Family     Subjective:     Chief Complaint / Reason for Physician Visit  No complaints currently. Plan of care and pertinent events reviewed with bedside nurse. Review of Systems:  Symptom Y/N Comments  Symptom Y/N Comments   Fever/Chills Y Low grade  Chest Pain N    Poor Appetite    Edema Y    Cough N   Abdominal Pain N    Sputum N   Joint Pain N    SOB/ODOM N   Pruritis/Rash N    Nausea/vomit N   Tolerating PT/OT     Diarrhea N   Tolerating Diet     Constipation    Other       Could NOT obtain due to:      Objective:     VITALS:   Last 24hrs VS reviewed since prior progress note.  Most recent are:  Patient Vitals for the past 24 hrs:   Temp Pulse Resp BP SpO2   08/29/21 1445 -- 88 -- (!) 145/75 --   08/29/21 1430 -- 87 -- (!) 142/70 --   08/29/21 1415 -- 90 -- (!) 158/59 --   08/29/21 1400 -- 79 -- (!) 168/53 --   08/29/21 1345 -- 72 -- (!) 164/69 --   08/29/21 1330 -- 80 -- (!) 168/70 --   08/29/21 1315 -- 80 -- 132/78 --   08/29/21 1300 -- 80 -- 125/68 --   08/29/21 1245 -- 72 -- (!) 105/55 --   08/29/21 1230 -- 70 -- (!) 105/40 --   08/29/21 1215 -- 81 -- 126/65 --   08/29/21 1200 -- 75 -- 131/62 --   08/29/21 1145 -- 72 -- (!) 134/58 --   08/29/21 1130 -- 76 -- (!) 134/54 --   08/29/21 1045 99.1 °F (37.3 °C) 72 16 (!) 174/65 --   08/29/21 1025 -- 69 16 122/62 100 %   08/29/21 1020 -- 69 17 120/70 97 %   08/29/21 1015 -- 71 16 (!) 151/69 100 %   08/29/21 0823 98.2 °F (36.8 °C) 88 20 (!) 153/65 97 %   08/29/21 0201 98.7 °F (37.1 °C) 62 20 (!) 113/45 98 %       Intake/Output Summary (Last 24 hours) at 8/29/2021 1612  Last data filed at 8/29/2021 1445  Gross per 24 hour   Intake --   Output 2000 ml   Net -2000 ml        I had a face to face encounter and independently examined this patient on 8/29/2021, as outlined below:  PHYSICAL EXAM:  General:  Awake and alert. Unusual affect. Confused at times. NAD. HEENT:  Normocephalic. Sclera anicteric. Mucous membranes moist.    Chest:  Resps even/unlabored with symmetrical CWE. Air entry diminished at bases. Lungs CTA. No use of accessory muscles. CV:  RRR. Normal S1/S2. No M/C/R appreciated. No JVD. 1 mm LLE edema. Right AKA. Cap refill < 3 sec. GI:  Abdomen soft/NT/ND. No organomegaly. No hernia. ABT X 4.    :  HD. Neurologic:  Nonfocal.  CN II-XII grossly intact. Face symmetrical.  Speech normal.     Psych:  Cooperative. No anxiety or agitation. Skin:  Warm and color appropriate. No rashes or jaundice. Turgor elastic. Reviewed most current lab test results and cultures  YES  Reviewed most current radiology test results   YES  Review and summation of old records today    NO  Reviewed patient's current orders and MAR    YES  PMH/SH reviewed - no change compared to H&P  ________________________________________________________________________  Care Plan discussed with:    Comments   Patient [address]     Consultant                        Multidiciplinary team rounds were held today with , nursing, pharmacist and clinical coordinator. Patient's plan of care was discussed; medications were reviewed and discharge planning was addressed. ________________________________________________________________________  Andie Douglas NP     Procedures: see electronic medical records for all procedures/Xrays and details which were not copied into this note but were reviewed prior to creation of Plan. LABS:  I reviewed today's most current labs and imaging studies.   Pertinent labs include:  Recent Labs     08/28/21  1741 08/27/21  2125   WBC 9.4 9.5   HGB 10.3* 10.7*   HCT 32.9* 33.2*    198     Recent Labs     08/28/21  1741 08/28/21  0035   * 134*   K 4.3 4.2    100   CO2 21 22   * 155*   BUN 53* 49*   CREA 14.50* 12.60*   CA 10.0 9.4   MG 2.1  --    PHOS 3.4  --    ALB 2.3*  --    TBILI 0.3  --    ALT 20  --        Signed: Ansley Bhagat, NP

## 2021-08-29 NOTE — CONSULTS
[address]    Name:  Nicky Forde  MR#:  [id number]  :  1950  ACCOUNT #:  [de-identified]  DATE OF SERVICE:  2021    IN-HOSPITAL NEPHROLOGY CONSULTATION    REFERRING PHYSICIAN:  Kim Rasmussen MD, Emergency Room. REASON FOR CONSULTATION:  Missed dialysis. HISTORY OF PRESENT ILLNESS:  The patient is a 67-year-old black woman who is a dialysis patient from [place]. She dialyzes at the [facility] unit in the area of [address] [place] [address],[address]. The patient presents to the emergency room and states that she needs dialysis because her AV fistula is not working. Her last dialysis was on . She is usually on TTS schedule. Arrangements were made for the patient to have PermCath placed, and when I saw her today, she was already on dialysis in the dialysis suite. The patient was seen and examined in the presence of acute dialysis nurse. The patient reports no shortness of breath. She is asking for pain medications, although she could not explain what kind of pain she is referring to. The patient seems to be somewhat confused. PAST MEDICAL HISTORY:  1. Acid reflux. 2.  End-stage renal disease. 3.  Congestive heart failure. 4.  Constipation. 5.  Diabetes, among many other diseases. PAST SURGICAL HISTORY:  1. Appendectomy. 2.  Cholecystectomy. 3.  Left arm AV fistula creation. ALLERGIES:  ALLERGIC TO CODEINE, HEPARIN, LOVENOX, LYRICA, MOTRIN, REGLAN, VIOXX. HOME MEDICATIONS:  Consist of:  1. Protonix. 2.  Bentyl. 3.  Insulin. 4.  Aspirin. 5.  Norvasc. 6.  Coreg. 7.  Chlorthalidone. 8.  Plavix. 9.  Colcrys. 10.  Lasix. 11.  Synthroid. 12.  Prinivil. 13.  Pravachol. FAMILY HISTORY:  Positive for diabetes and COPD. SOCIAL HISTORY:  The patient is a current smoker. REVIEW OF SYSTEMS:  The patient reports pain of unclear location. She is not short of breath. She does not have nausea, vomiting, or diarrhea.     PHYSICAL EXAMINATION:  GENERAL:  Elderly black woman in no apparent distress. She is receiving dialysis through AV catheter on the right neck. VITAL SIGNS:  Blood pressure is 122/62, heart rate is 69, temperature is 98.2. HEENT:  Head is normocephalic. Eyes with anicteric sclerae. Mouth with moist oral mucosa. NECK:  Supple. LUNGS:  Clear. HEART:  S1 and S2, at regular rate and rhythm. ABDOMEN:  Soft, nontender, nondistended. EXTREMITIES:  With trace edema. AV fistula on the left upper arm. There is no bruit or thrill. NEUROLOGIC:  Nonfocal.  The patient is confused. LABORATORY DATA:  Sodium is 133, potassium is 4.3, CO2 is 21, BUN is 53, creatinine is 14.5. Hemoglobin is 10.3. IMPRESSION:  1. End-stage renal disease, on hemodialysis. The patient has significant azotemia due to missed dialysis treatment. Now she is being dialyzed. She is hemodynamically stable. 2.  Anemia of end-stage renal disease. Hemoglobin is at target. RECOMMENDATIONS:  Complete a full treatment today and the patient will go back to her home unit on Monday and her primary nephrologist will arrange for declot of her AV fistula . Going home right after she completes her dialysis. Thank you very much for the opportunity to be part of this patient's care.       MD FAUSTINA [email or web address]  D:  08/29/2021 11:19  T:  08/29/2021 14:58  JOB #:  [id number]  CC:  Radha Castellano MD

## 2021-08-29 NOTE — H&P
HISTORY AND PHYSICAL      PCP: Unknown (Inactive)  History source: the patient      CC: AV fistula not working      HPI: 80 y.o lady w/ ESRD on HD, DM, HTN, CHF, R AKA, who presents due to her LUE AVF not working. She last had dialysis on 8/24. She denies chest pain, she is feeling a little dyspneic. No fever or cough. PMH/PSH:  Past Medical History:   Diagnosis Date    Acid reflux 11/11/2020    Acute renal failure ([address] 11/11/2020    Benign essential HTN 11/11/2020    Chronic congestive heart failure ([address] 11/11/2020    Chronic kidney disease     Constipation 11/11/2020    Diabetes [facility]     Heart disease 11/11/2020    Hemodialysis access site with mature fistula (HCC)     left arm    Hx of colonic polyp     Hypothyroidism 11/11/2020    MI (myocardial infarction) ([facility]     OLD MI    Spasm 11/11/2020    Stage 4 chronic kidney disease ([facility] 11/11/2020    Type II diabetes mellitus ([facility] 11/11/2020     Past Surgical History:   Procedure Laterality Date    HX APPENDECTOMY      HX CHOLECYSTECTOMY      HX COLONOSCOPY  06/16/2015    RECTAL POLYP    HX COLONOSCOPY  03/25/2013    EGD AND COLONOSCOPY    HX COLONOSCOPY  10/30/2019    COLONOSCOPY-CECAL POLYP, NUMEROUS RECTAL POLYPS    HX GI  10/08/2019    EGD - BX-GERD, BLOATING, ESOPHAGITIS    HX GI  6-16-2-15    EGD    HX GI  03/25/2013    EGD    HX GYN      HYSTERECTOMY    HX ORTHOPAEDIC      R LEG AMPUTATION    NC CARDIAC SURG PROCEDURE UNLIST  2012, 2015    STENT PLACEMENT       Home meds:   Prior to Admission medications    Medication Sig Start Date End Date Taking? Authorizing Provider   pantoprazole (PROTONIX) 40 mg tablet TAKE ONE tablet DAILY 6/1/21   Corbin Kidd MD   dicyclomine (BENTYL) 10 mg capsule Take 1 Cap by mouth three (3) times daily as needed for Abdominal Cramps.  Indications: irritable colon 2/8/21   Corbin Kidd MD   Lancing Device with Lancets (Accu-Chek FastClix Lancing Dev) kit [facility] Lancing Device   USED TO CHECK BLOOD SUGAR DX:E11.65 3 TIMES A DAY -USING INSULIN 90 DAYS    Provider, Historical   Insulin Needles, Disposable, (BD Ultra-Fine Mini Pen Needle) 31 gauge x 3/16\" ndle BD Ultra-Fine Mini Pen Needle 31 gauge x 3/16\"    Provider, Historical   Insulin Needles, Disposable, (Natalie Pen Needle) 32 gauge x 5/32\" ndle BD Ultra-Fine Natalie Pen Needle 32 gauge x 5/32\"    Provider, Historical   diclofenac (Voltaren) 1 % gel Voltaren 1 % topical gel    Provider, Historical   amLODIPine (NORVASC) 10 mg tablet amlodipine 10 mg tablet    Provider, Historical   aspirin delayed-release 81 mg tablet Take 81 mg by mouth.     Provider, Historical   Accu-Chek Marcela Plus test strp strip USE TO CHECK BLOOD SUGAR THREE TIMES DAILY 10/20/20   Provider, Historical   Accu-Chek Guide Glucose Meter misc USE TO TEST BLOOD SUGAR THREE TIMES DAILY 10/20/20   Provider, Historical   carvediloL (COREG) 6.25 mg tablet carvedilol 6.25 mg tablet    Provider, Historical   chlorthalidone (HYGROTON) 25 mg tablet chlorthalidone 25 mg tablet    Provider, Historical   cholecalciferol (VITAMIN D3) (50,000 UNITS /1250 MCG) capsule cholecalciferol (vitamin D3) 1,250 mcg (50,000 unit) capsule   TAKE ONE CAPSULE BY MOUTH ONCE A WEEK FOR 8 WEEKS    Provider, Historical   clopidogreL (PLAVIX) 75 mg tab 1 TABLET BY MOUTH EVERY DAY 9/18/20   Provider, Historical   colchicine (Colcrys) 0.6 mg tablet Colcrys 0.6 mg tablet    Provider, Historical   furosemide (LASIX) 40 mg tablet TAKE 2 TABLETS BY MOUTH EVERY DAY 9/14/20   Provider, Historical   hydrALAZINE (APRESOLINE) 100 mg tablet TAKE ONE TABLET TWICE DAILY ON DIALYSIS DAYS AND THREE TIMES DAILY OTHER DAYS 9/10/20   Provider, Historical   insulin aspart U-100 (NovoLOG U-100 Insulin aspart) 100 unit/mL injection Novolog U-100 Insulin aspart 100 unit/mL subcutaneous solution    Provider, Historical   insulin detemir U-100 (Levemir U-100 Insulin) 100 unit/mL injection Levemir U-100 Insulin 100 unit/mL subcutaneous solution    Provider, Historical   isosorbide mononitrate ER (IMDUR) 30 mg tablet isosorbide mononitrate ER 30 mg tablet,extended release 24 hr    Provider, Historical   Accu-Chek Fastclix Lancet Drum misc CHECK BLOOD SUGAR THREE TIMES DAILY 10/20/20   Provider, Historical   levothyroxine (SYNTHROID) 50 mcg tablet TAKE 1 TABLET BY MOUTH IN THE MORNING ON AN EMPTY STOMACH ONCE DAILY 9/23/20   Provider, Historical   lidocaine-prilocaine (EMLA) topical cream APPLY SMALL AMOUNT TO ACCESS SITE (AVF) 1 TO 2 HOURS BEFORE DIALYSIS. COVER WITH OCCLUSIVE DRESSING (SARAN WRAP) THREE DAYS A WEEK (3 TIMES 9/28/20   Provider, Historical   lisinopriL (PRINIVIL, ZESTRIL) 40 mg tablet TAKE 1 TABLET BY MOUTH DAILY IF SYSTOLIC >418 9/36/56   Provider, Historical   OxyCONTIN 10 mg ER tablet TAKE ONE TABLET BY MOUTH EVERY TWELVE HOURS 10/19/20   Provider, Historical   oxyCODONE-acetaminophen (PERCOCET 7.5) 7.5-325 mg per tablet TAKE ONE TABLET BY MOUTH THREE TIMES DAILY AS NEEDED 10/19/20   Provider, Historical   pravastatin (PRAVACHOL) 40 mg tablet pravastatin 40 mg tablet    Provider, Historical   Renvela 800 mg tab tab TAKE 3 TABLETS BY MOUTH THREE TIMES DAILY WITH MEALS AND 2 TABLETS WITH SNACKS 10/27/20   Provider, Historical   silver sulfADIAZINE (SILVADENE) 1 % topical cream APPLY TO AFFECTED AREA ONCE DAILY 9/21/20   Provider, Historical   linaCLOtide (Linzess) 145 mcg cap capsule Take 1 Cap by mouth Daily (before breakfast). Indications: chronic idiopathic constipation 11/11/20   Danny Lerner MD       Allergies:   Allergies   Allergen Reactions    Gabapentin Swelling    Codeine Unknown (comments)     TYLENOL # 3    Heparin Unknown (comments)     SAID MADE HER LOSE HER LEG    Lovenox [Enoxaparin] Unknown (comments)    Lyrica [Pregabalin] Unknown (comments)    Motrin [Ibuprofen] Unknown (comments)    Reglan [Metoclopramide Hcl] Unknown (comments)    Vioxx [Rofecoxib] Unknown (comments) FH:  Family History   Problem Relation Age of Onset    Diabetes Other     Lung Disease Other     COPD Other        SH:  Social History     Tobacco Use    Smoking status: Current Every Day Smoker     Packs/day: 0.50    Smokeless tobacco: Never Used   Substance Use Topics    Alcohol use: Never       ROS: A comprehensive review of systems was negative except for that written in the HPI. PHYSICAL EXAM:  Visit Vitals  BP (!) 194/75 (BP 1 Location: Right upper arm, BP Patient Position: At rest)   Pulse 70   Temp 98.4 °F (36.9 °C)   SpO2 97%       Gen: NAD, non-toxic  HEENT: anicteric sclerae, normal conjunctiva  Neck: supple, trachea midline, no adenopathy  Heart: RRR, no JVD, LLE edema  Lungs: CTA b/l, non-labored respirations  Abd: soft, NT, ND, BS+  Extr: warm  Skin: dry, no rash  Neuro: CN II-XII grossly intact, normal speech, moves all extremities  Psych: normal mood, appropriate affect      Labs/Imaging:  Recent Results (from the past 24 hour(s))   CBC WITH AUTOMATED DIFF    Collection Time: 08/27/21  9:25 PM   Result Value Ref Range    WBC 9.5 3.6 - 11.0 K/uL    RBC 3.27 (L) 3.80 - 5.20 M/uL    HGB 10.7 (L) 11.5 - 16.0 g/dL    HCT 33.2 (L) 35.0 - 47.0 %    .5 (H) 80.0 - 99.0 FL    MCH 32.7 26.0 - 34.0 PG    MCHC 32.2 30.0 - 36.5 g/dL    RDW 16.2 (H) 11.5 - 14.5 %    PLATELET 780 489 - 766 K/uL    MPV 9.7 8.9 - 12.9 FL    NRBC 0.0 0.0  WBC    ABSOLUTE NRBC 0.00 0.00 - 0.01 K/uL    NEUTROPHILS 70 32 - 75 %    LYMPHOCYTES 13 12 - 49 %    MONOCYTES 11 5 - 13 %    EOSINOPHILS 4 0 - 7 %    BASOPHILS 0 0 - 1 %    IMMATURE GRANULOCYTES 2 (H) 0 - 0.5 %    ABS. NEUTROPHILS 6.6 1.8 - 8.0 K/UL    ABS. LYMPHOCYTES 1.2 0.8 - 3.5 K/UL    ABS. MONOCYTES 1.1 (H) 0.0 - 1.0 K/UL    ABS. EOSINOPHILS 0.4 0.0 - 0.4 K/UL    ABS. BASOPHILS 0.0 0.0 - 0.1 K/UL    ABS. IMM.  GRANS. 0.2 (H) 0.00 - 0.04 K/UL    DF AUTOMATED     METABOLIC PANEL, BASIC    Collection Time: 08/28/21 12:35 AM   Result Value Ref Range Sodium 134 (L) 136 - 145 mmol/L    Potassium 4.2 3.5 - 5.1 mmol/L    Chloride 100 97 - 108 mmol/L    CO2 22 21 - 32 mmol/L    Anion gap 12 5 - 15 mmol/L    Glucose 155 (H) 65 - 100 mg/dL    BUN 49 (H) 6 - 20 mg/dL    Creatinine 12.60 (H) 0.55 - 1.02 mg/dL    BUN/Creatinine ratio 4 (L) 12 - 20      GFR est AA 4 (L) >60 ml/min/1.73m2    GFR est non-AA 3 (L) >60 ml/min/1.73m2    Calcium 9.4 8.5 - 10.1 mg/dL   EKG, 12 LEAD, INITIAL    Collection Time: 08/28/21  3:52 PM   Result Value Ref Range    Ventricular Rate 73 BPM    Atrial Rate 73 BPM    P-R Interval 136 ms    QRS Duration 100 ms    Q-T Interval 392 ms    QTC Calculation (Bezet) 431 ms    Calculated P Axis 42 degrees    Calculated R Axis 18 degrees    Calculated T Axis 28 degrees    Diagnosis       Sinus rhythm with premature supraventricular complexes  Otherwise normal ECG  No previous ECGs available     SAMPLES BEING HELD    Collection Time: 08/28/21  5:41 PM   Result Value Ref Range    SAMPLES BEING HELD 1RED 1LAV 1BLU 1PST     COMMENT        Add-on orders for these samples will be processed based on acceptable specimen integrity and analyte stability, which may vary by analyte.    PHOSPHORUS    Collection Time: 08/28/21  5:41 PM   Result Value Ref Range    Phosphorus 3.4 2.6 - 4.7 MG/DL   MAGNESIUM    Collection Time: 08/28/21  5:41 PM   Result Value Ref Range    Magnesium 2.1 1.6 - 2.4 mg/dL   CBC WITH AUTOMATED DIFF    Collection Time: 08/28/21  5:41 PM   Result Value Ref Range    WBC 9.4 3.6 - 11.0 K/uL    RBC 3.18 (L) 3.80 - 5.20 M/uL    HGB 10.3 (L) 11.5 - 16.0 g/dL    HCT 32.9 (L) 35.0 - 47.0 %    .5 (H) 80.0 - 99.0 FL    MCH 32.4 26.0 - 34.0 PG    MCHC 31.3 30.0 - 36.5 g/dL    RDW 16.3 (H) 11.5 - 14.5 %    PLATELET 915 106 - 208 K/uL    MPV 9.6 8.9 - 12.9 FL    NRBC 0.0 0  WBC    ABSOLUTE NRBC 0.00 0.00 - 0.01 K/uL    NEUTROPHILS 65 32 - 75 %    LYMPHOCYTES 15 12 - 49 %    MONOCYTES 14 (H) 5 - 13 %    EOSINOPHILS 3 0 - 7 %    BASOPHILS 1 0 - 1 %    IMMATURE GRANULOCYTES 2 (H) 0.0 - 0.5 %    ABS. NEUTROPHILS 6.1 1.8 - 8.0 K/UL    ABS. LYMPHOCYTES 1.4 0.8 - 3.5 K/UL    ABS. MONOCYTES 1.3 (H) 0.0 - 1.0 K/UL    ABS. EOSINOPHILS 0.3 0.0 - 0.4 K/UL    ABS. BASOPHILS 0.1 0.0 - 0.1 K/UL    ABS. IMM. GRANS. 0.2 (H) 0.00 - 0.04 K/UL    DF AUTOMATED     METABOLIC PANEL, COMPREHENSIVE    Collection Time: 08/28/21  5:41 PM   Result Value Ref Range    Sodium 133 (L) 136 - 145 mmol/L    Potassium 4.3 3.5 - 5.1 mmol/L    Chloride 102 97 - 108 mmol/L    CO2 21 21 - 32 mmol/L    Anion gap 10 5 - 15 mmol/L    Glucose 154 (H) 65 - 100 mg/dL    BUN 53 (H) 6 - 20 MG/DL    Creatinine 14.50 (H) 0.55 - 1.02 MG/DL    BUN/Creatinine ratio 4 (L) 12 - 20      GFR est AA 3 (L) >60 ml/min/1.73m2    GFR est non-AA 3 (L) >60 ml/min/1.73m2    Calcium 10.0 8.5 - 10.1 MG/DL    Bilirubin, total 0.3 0.2 - 1.0 MG/DL    ALT (SGPT) 20 12 - 78 U/L    AST (SGOT) 30 15 - 37 U/L    Alk. phosphatase 102 45 - 117 U/L    Protein, total 6.9 6.4 - 8.2 g/dL    Albumin 2.3 (L) 3.5 - 5.0 g/dL    Globulin 4.6 (H) 2.0 - 4.0 g/dL    A-G Ratio 0.5 (L) 1.1 - 2.2     TROPONIN I    Collection Time: 08/28/21  5:41 PM   Result Value Ref Range    Troponin-I, Qt. <0.05 <0.05 ng/mL       Recent Labs     08/28/21 1741 08/27/21  2125   WBC 9.4 9.5   HGB 10.3* 10.7*   HCT 32.9* 33.2*    198     Recent Labs     08/28/21 1741 08/28/21  0035   * 134*   K 4.3 4.2    100   CO2 21 22   BUN 53* 49*   CREA 14.50* 12.60*   * 155*   CA 10.0 9.4   MG 2.1  --    PHOS 3.4  --      Recent Labs     08/28/21 1741   ALT 20      TBILI 0.3   TP 6.9   ALB 2.3*   GLOB 4.6*       Recent Labs     08/28/21 1741   TROIQ <0.05       No results for input(s): INR, PTP, APTT, INREXT in the last 72 hours. No results for input(s): PH, PCO2, PO2 in the last 72 hours. No results found. Assessment & Plan:     Clotted dialysis access: for temp cath tomorrow with HD.  Nephrology and IR conslted    DM    HTN    CHF    L AKA    Chronic pain    DVT ppx: SCDs  Code status: full  Disposition: home when ready    Signed By: Gema Agrawal MD     August 28, 2021

## 2021-08-29 NOTE — ED NOTES
Spoke with IR to try to get an estimated time frame for the patient to have her temp cath placed. They will return call shortly.

## 2021-08-30 ENCOUNTER — APPOINTMENT (OUTPATIENT)
Dept: INTERVENTIONAL RADIOLOGY/VASCULAR | Age: 71
End: 2021-08-30
Attending: FAMILY MEDICINE
Payer: MEDICARE

## 2021-08-30 VITALS
BODY MASS INDEX: 34.36 KG/M2 | HEART RATE: 75 BPM | HEIGHT: 70 IN | TEMPERATURE: 98.7 F | SYSTOLIC BLOOD PRESSURE: 165 MMHG | RESPIRATION RATE: 17 BRPM | DIASTOLIC BLOOD PRESSURE: 53 MMHG | OXYGEN SATURATION: 99 % | WEIGHT: 240 LBS

## 2021-08-30 LAB
GLUCOSE BLD STRIP.AUTO-MCNC: 166 MG/DL (ref 65–117)
GLUCOSE BLD STRIP.AUTO-MCNC: 169 MG/DL (ref 65–117)
GLUCOSE BLD STRIP.AUTO-MCNC: 211 MG/DL (ref 65–117)
SERVICE CMNT-IMP: ABNORMAL

## 2021-08-30 PROCEDURE — 2709999900 HC NON-CHARGEABLE SUPPLY

## 2021-08-30 PROCEDURE — 74011250636 HC RX REV CODE- 250/636: Performed by: HOSPITALIST

## 2021-08-30 PROCEDURE — 99218 HC RM OBSERVATION: CPT

## 2021-08-30 PROCEDURE — 36558 INSERT TUNNELED CV CATH: CPT

## 2021-08-30 PROCEDURE — 74011250637 HC RX REV CODE- 250/637: Performed by: HOSPITALIST

## 2021-08-30 PROCEDURE — 74011250636 HC RX REV CODE- 250/636: Performed by: RADIOLOGY

## 2021-08-30 PROCEDURE — 74011636637 HC RX REV CODE- 636/637: Performed by: HOSPITALIST

## 2021-08-30 PROCEDURE — 77030010507 HC ADH SKN DERMBND J&J -B

## 2021-08-30 PROCEDURE — 82962 GLUCOSE BLOOD TEST: CPT

## 2021-08-30 PROCEDURE — 96374 THER/PROPH/DIAG INJ IV PUSH: CPT

## 2021-08-30 PROCEDURE — 77030002996 HC SUT SLK J&J -A

## 2021-08-30 PROCEDURE — 74011250637 HC RX REV CODE- 250/637: Performed by: NURSE PRACTITIONER

## 2021-08-30 PROCEDURE — C1750 CATH, HEMODIALYSIS,LONG-TERM: HCPCS

## 2021-08-30 PROCEDURE — 74011000250 HC RX REV CODE- 250: Performed by: RADIOLOGY

## 2021-08-30 RX ORDER — NALOXONE HYDROCHLORIDE 4 MG/.1ML
SPRAY NASAL
Qty: 1 EACH | Refills: 0 | Status: SHIPPED | OUTPATIENT
Start: 2021-08-30

## 2021-08-30 RX ORDER — MIDAZOLAM HYDROCHLORIDE 1 MG/ML
5 INJECTION, SOLUTION INTRAMUSCULAR; INTRAVENOUS
Status: DISCONTINUED | OUTPATIENT
Start: 2021-08-30 | End: 2021-08-30 | Stop reason: HOSPADM

## 2021-08-30 RX ORDER — SODIUM CITRATE 4 % (5 ML)
5 SYRINGE (ML) MISCELLANEOUS
Status: DISCONTINUED | OUTPATIENT
Start: 2021-08-30 | End: 2021-08-30 | Stop reason: HOSPADM

## 2021-08-30 RX ORDER — FENTANYL CITRATE 50 UG/ML
100 INJECTION, SOLUTION INTRAMUSCULAR; INTRAVENOUS
Status: DISCONTINUED | OUTPATIENT
Start: 2021-08-30 | End: 2021-08-30 | Stop reason: HOSPADM

## 2021-08-30 RX ORDER — LIDOCAINE HYDROCHLORIDE 20 MG/ML
20 INJECTION, SOLUTION INFILTRATION; PERINEURAL
Status: COMPLETED | OUTPATIENT
Start: 2021-08-30 | End: 2021-08-30

## 2021-08-30 RX ORDER — FLUMAZENIL 0.1 MG/ML
0.5 INJECTION INTRAVENOUS
Status: DISCONTINUED | OUTPATIENT
Start: 2021-08-30 | End: 2021-08-30 | Stop reason: HOSPADM

## 2021-08-30 RX ORDER — NALOXONE HYDROCHLORIDE 0.4 MG/ML
0.4 INJECTION, SOLUTION INTRAMUSCULAR; INTRAVENOUS; SUBCUTANEOUS
Status: DISCONTINUED | OUTPATIENT
Start: 2021-08-30 | End: 2021-08-30 | Stop reason: HOSPADM

## 2021-08-30 RX ORDER — SODIUM CHLORIDE 9 MG/ML
25 INJECTION, SOLUTION INTRAVENOUS
Status: DISCONTINUED | OUTPATIENT
Start: 2021-08-30 | End: 2021-08-30 | Stop reason: HOSPADM

## 2021-08-30 RX ADMIN — CEFAZOLIN SODIUM 2 G: 1 INJECTION, POWDER, FOR SOLUTION INTRAMUSCULAR; INTRAVENOUS at 09:17

## 2021-08-30 RX ADMIN — OXYCODONE AND ACETAMINOPHEN 1 TABLET: 10; 325 TABLET ORAL at 17:46

## 2021-08-30 RX ADMIN — SEVELAMER CARBONATE 2400 MG: 800 TABLET, FILM COATED ORAL at 12:28

## 2021-08-30 RX ADMIN — Medication 10 ML: at 14:00

## 2021-08-30 RX ADMIN — MIDAZOLAM 1 MG: 1 INJECTION INTRAMUSCULAR; INTRAVENOUS at 09:51

## 2021-08-30 RX ADMIN — FENTANYL CITRATE 50 MCG: 0.05 INJECTION, SOLUTION INTRAMUSCULAR; INTRAVENOUS at 09:51

## 2021-08-30 RX ADMIN — CLOPIDOGREL BISULFATE 75 MG: 75 TABLET ORAL at 12:29

## 2021-08-30 RX ADMIN — INSULIN LISPRO 3 UNITS: 100 INJECTION, SOLUTION INTRAVENOUS; SUBCUTANEOUS at 17:55

## 2021-08-30 RX ADMIN — MIDAZOLAM 1 MG: 1 INJECTION INTRAMUSCULAR; INTRAVENOUS at 09:55

## 2021-08-30 RX ADMIN — ISOSORBIDE MONONITRATE 60 MG: 60 TABLET, EXTENDED RELEASE ORAL at 12:30

## 2021-08-30 RX ADMIN — CARVEDILOL 6.25 MG: 6.25 TABLET, FILM COATED ORAL at 17:48

## 2021-08-30 RX ADMIN — MUPIROCIN: 20 OINTMENT TOPICAL at 12:39

## 2021-08-30 RX ADMIN — Medication 5 ML: at 10:06

## 2021-08-30 RX ADMIN — ASPIRIN 81 MG: 81 TABLET, COATED ORAL at 12:28

## 2021-08-30 RX ADMIN — ONDANSETRON 4 MG: 4 TABLET, ORALLY DISINTEGRATING ORAL at 00:23

## 2021-08-30 RX ADMIN — ONDANSETRON 4 MG: 2 INJECTION INTRAMUSCULAR; INTRAVENOUS at 00:18

## 2021-08-30 RX ADMIN — PANTOPRAZOLE SODIUM 40 MG: 40 TABLET, DELAYED RELEASE ORAL at 12:30

## 2021-08-30 RX ADMIN — CARVEDILOL 6.25 MG: 6.25 TABLET, FILM COATED ORAL at 12:30

## 2021-08-30 RX ADMIN — PRAVASTATIN SODIUM 40 MG: 40 TABLET ORAL at 12:31

## 2021-08-30 RX ADMIN — LOSARTAN POTASSIUM 100 MG: 50 TABLET, FILM COATED ORAL at 12:30

## 2021-08-30 RX ADMIN — HYDRALAZINE HYDROCHLORIDE 100 MG: 50 TABLET, FILM COATED ORAL at 12:30

## 2021-08-30 RX ADMIN — SODIUM CHLORIDE 25 ML/HR: 9 INJECTION, SOLUTION INTRAVENOUS at 09:18

## 2021-08-30 RX ADMIN — LIDOCAINE HYDROCHLORIDE 14 ML: 20 INJECTION, SOLUTION INFILTRATION; PERINEURAL at 10:05

## 2021-08-30 RX ADMIN — FENTANYL CITRATE 25 MCG: 0.05 INJECTION, SOLUTION INTRAMUSCULAR; INTRAVENOUS at 09:55

## 2021-08-30 RX ADMIN — AMLODIPINE BESYLATE 5 MG: 5 TABLET ORAL at 12:31

## 2021-08-30 NOTE — DISCHARGE INSTRUCTIONS
HOSPITALIST DISCHARGE INSTRUCTIONS    NAME: Franko Carrera   :  1950   MRN:  [id number]     Date/Time:  2021 2:45 PM    ADMIT DATE: 2021   DISCHARGE DATE: 2021     Attending Physician: Olivia Jett NP    DISCHARGE DIAGNOSIS:  Clotted dialysis access  End-stage renal disease  Hyponatremia (mildly low blood sodium which should be corrected with dialysis)  Coronary artery disease (abnormal blood flow to the heart muscle)  Hypertension (elevated blood pressure)  Dyslipidemia (abnormal blood lipids/fats)  Diabetes  Anemia of chronic disease (chronically low blood count caused by end-stage renal disease)  Chronic pain syndrome      Medications: Per above medication reconciliation. Pain Management: per above medications    Recommended diet: Diabetic Diet and Renal Diet    Recommended activity: Activity as tolerated    Wound care: Keep the dressing over your dialysis catheter dry at all times. This will be changed with each dialysis    Indwelling devices: Tunneled dialysis catheter    Supplemental Oxygen: None    Required Lab work: Per primary care provider and nephrologist    Glucose management:  As you did prior to hospital admission. Take your insulin as you did before arriving at the hospital.  Also monitor your blood sugar as you did prior to hospitalization    Code status: Full     Take all discharge paperwork with you to all of your follow up doctor appointments. Take your medications exactly as outlined in your discharge paperwork. Do not take any other medications unless specifically prescribed after your hospital stay. If your symptoms return/worsen seek medical attention immediately. You should not be on multiple long-acting opioid pain medications. Additionally, you are at a significantly higher risk for respiratory compromise when you take opioid pain medications with the benzodiazepine drug class (triazolam).   Given your kidney disease and the fact that you do not clear medication metabolites normally you are also at a higher risk for respiratory compromise. As result, you are being sent home with a medication called naloxone (Narcan) can be used in the event of medication overdose. Outside physician follow up: Follow-up Information     Follow up With Specialties Details Why Contact Info    Unknown   Call your primary care provider to be seen as soon as possible        Follow-up with your dialysis center as scheduled. You will need to contact your nephrologist so that he/she can arrange for repair of your permanent dialysis access graft           Information obtained by :  I understand that if any problems occur once I am at home I am to contact my physician. I understand and acknowledge receipt of the instructions indicated above.                                                                                                                                            Physician's or R.N.'s Signature                                                                  Date/Time                                                                                                                                              Patient or Representative

## 2021-08-30 NOTE — DISCHARGE SUMMARY
Hospitalist Discharge Summary     Patient ID:  Norman Vargas  [id number]  80 y.o.  1950 8/28/2021    PCP on record: Unknown (Inactive)    Admit date: 8/28/2021  Discharge date and time: 8/30/2021    DISCHARGE DIAGNOSIS:  Clotted/dysfunctional dialysis access  ESRD   Secondary hyperparathyroidism   Hyponatremia   CAD  HTN  Dyslipidemia   DM II  Hyperglycemia   Anemia of chronic disease  Chronic pain syndrome    CONSULTATIONS:  IP CONSULT TO INTERVENTIONAL RADIOLOGY  IP CONSULT TO NEPHROLOGY    Excerpted HPI from H&P of Arnold Martinez MD:  [age] y.o lady w/ ESRD on HD, DM, HTN, CHF, R AKA, who presents due to her LUE AVF not working. She last had dialysis on 8/24. She denies chest pain, she is feeling a little dyspneic. No fever or cough. \"    ______________________________________________________________________  DISCHARGE SUMMARY/HOSPITAL COURSE:  for full details see H&P, daily progress notes, labs, consult notes. Clotted/dysfunctional dialysis access  ESRD   Secondary hyperparathyroidism   - Patient was admitted for tunneled HD catheter insertion and HD however she initially received a nontunneled catheter.  - Nontunneled HD catheter was removed and tunneled catheter was placed on 08/30/2021.  - Continue sevelamer 2,400 mg po tid with meals.    - Patient was instructed that she needs to follow-up at her home dialysis center as scheduled. - Patient was instructed that she will need to contact her nephrologist so that he/she can make arrangements to evaluate her permanent dialysis access and have it revised/replaced.     Hyponatremia   - Asymptomatic.  - HD as noted above. CAD  HTN  Dyslipidemia   - Continue asa 81 mg po daily. - Continue clopidogrel 75 mg po daily. - Continue amlodipine 5 mg po daily. - Continue carvedilol 6.25 mg po bid. - Continue hydralazine 100 mg pp daily. - Continue isosorbide ER 60 mg po daily. - Continue losartan 100 mg po daily.    - Continue pravastatin 40 mg po daily.      DM II  Hyperglycemia   - Continue monitoring blood sugar as PTA.  - Continue home insulin as PTA.     Anemia of chronic disease  - Patient did not require treatment while hospitalized.     Chronic pain syndrome  - PDMP reviewed. - Oxycodone ER 10 mg #56, 28 day supply - last filled 08/16/21 (MME 30). - Morphine ER 15 mg #56, 28 day supply - last filled 08/17/21 (MME = 30). - Oxycodone/apap 7.5/325 mg #84, 28 day supply -  Last filled 08/11/21 (MME - 34). - Triazolam 0.25 mg, #30, 30 day supply - last filled 08/23/21.  - Patient was counseled on the risks of concomitant use of opioid analgesics and benzodiazepines. Additionally, she was counseled on the use of multiple long-acting opiates. - Discharged with naloxone nasal spray. All discharge instructions and discharge medications were reviewed with the patient who verbalized understanding.    _______________________________________________________________________  Patient seen and examined by me on discharge day. Pertinent Findings:  General:  A/A/O X 3. NAD. Unusual affect. HEENT:  Normocephalic. Sclera anicteric. EOMI. Mucous membranes moist.    Chest:  Resps even/unlabored with symmetrical CWE. Air entry finished at bases. Lungs CTA. No use of accessory muscles. CV:  RRR. Normal S1/S2. No M/C/R appreciated. No JVD. 1 mm pretibial edema mio. Cap refill < 3 sec. GI:  Abdomen soft/NT/ND. No organomegaly. No hernia. ABT X 4.    :  HD. Neurologic:  Nonfocal.  CN II-XII grossly intact. Face symmetrical.  Speech normal.     Psych:  Cooperative. No anxiety or agitation. No suicidal/homicidal ideation. Skin:  Warm and color appropriate. No rashes or jaundice.   Turgor elastic.    _______________________________________________________________________  DISCHARGE MEDICATIONS:   Current Discharge Medication List      START taking these medications    Details   naloxone (Narcan) 4 mg/actuation nasal spray Use 1 spray intranasally, then discard. Repeat with new spray every 2 min as needed for opioid overdose symptoms, alternating nostrils. Qty: 1 Each, Refills: 0  Start date: 8/30/2021         CONTINUE these medications which have NOT CHANGED    Details   losartan (COZAAR) 100 mg tablet Take 100 mg by mouth daily. amLODIPine (Norvasc) 5 mg tablet Take 5 mg by mouth daily. carvediloL (COREG) 6.25 mg tablet carvedilol 6.25 mg tablet      hydrALAZINE (APRESOLINE) 100 mg tablet Take 100 mg by mouth daily. isosorbide mononitrate ER (IMDUR) 30 mg tablet Take 60 mg by mouth.      pantoprazole (PROTONIX) 40 mg tablet TAKE ONE tablet DAILY  Qty: 30 Tablet, Refills: 5    Comments: This prescription was filled on 5/7/2021. Any refills authorized will be placed on file. Associated Diagnoses: Gastroesophageal reflux disease with esophagitis without hemorrhage      dicyclomine (BENTYL) 10 mg capsule Take 1 Cap by mouth three (3) times daily as needed for Abdominal Cramps. Indications: irritable colon  Qty: 90 Cap, Refills: 3    Associated Diagnoses: Bloating      diclofenac (Voltaren) 1 % gel Voltaren 1 % topical gel      aspirin delayed-release 81 mg tablet Take 81 mg by mouth.       Accu-Chek Marcela Plus test strp strip USE TO CHECK BLOOD SUGAR THREE TIMES DAILY      Accu-Chek Guide Glucose Meter misc USE TO TEST BLOOD SUGAR THREE TIMES DAILY      cholecalciferol (VITAMIN D3) (50,000 UNITS /1250 MCG) capsule cholecalciferol (vitamin D3) 1,250 mcg (50,000 unit) capsule   TAKE ONE CAPSULE BY MOUTH ONCE A WEEK FOR 8 WEEKS      clopidogreL (PLAVIX) 75 mg tab 1 TABLET BY MOUTH EVERY DAY      colchicine (Colcrys) 0.6 mg tablet Colcrys 0.6 mg tablet      insulin aspart U-100 (NovoLOG U-100 Insulin aspart) 100 unit/mL injection Novolog U-100 Insulin aspart 100 unit/mL subcutaneous solution      insulin detemir U-100 (Levemir U-100 Insulin) 100 unit/mL injection Levemir U-100 Insulin 100 unit/mL subcutaneous solution      Accu-Chek Fastclix Lancet Drum misc CHECK BLOOD SUGAR THREE TIMES DAILY      levothyroxine (SYNTHROID) 50 mcg tablet TAKE 1 TABLET BY MOUTH IN THE MORNING ON AN EMPTY STOMACH ONCE DAILY      lidocaine-prilocaine (EMLA) topical cream APPLY SMALL AMOUNT TO ACCESS SITE (AVF) 1 TO 2 HOURS BEFORE DIALYSIS. COVER WITH OCCLUSIVE DRESSING (SARAN WRAP) THREE DAYS A WEEK (3 TIMES      OxyCONTIN 10 mg ER tablet TAKE ONE TABLET BY MOUTH EVERY TWELVE HOURS      oxyCODONE-acetaminophen (PERCOCET 7.5) 7.5-325 mg per tablet TAKE ONE TABLET BY MOUTH THREE TIMES DAILY AS NEEDED      pravastatin (PRAVACHOL) 40 mg tablet pravastatin 40 mg tablet      Renvela 800 mg tab tab TAKE 3 TABLETS BY MOUTH THREE TIMES DAILY WITH MEALS AND 2 TABLETS WITH SNACKS      silver sulfADIAZINE (SILVADENE) 1 % topical cream APPLY TO AFFECTED AREA ONCE DAILY      linaCLOtide (Linzess) 145 mcg cap capsule Take 1 Cap by mouth Daily (before breakfast). Indications: chronic idiopathic constipation  Qty: 30 Cap, Refills: 3    Associated Diagnoses: Chronic idiopathic constipation               Patient Follow Up Instructions: Activity: Activity as tolerated  Diet: Diabetic Diet and Renal Diet  Wound Care: Keep pressing over tunneled dialysis catheter dry at all times. Change dressing with each dialysis    Follow-up as noted below  Follow-up tests/labs per PCP/nephrologist    Follow-up Information     Follow up With Specialties Details Why Contact Info    Unknown   Call your primary care provider to be seen as soon as possible        Follow-up with your dialysis center as scheduled.   You will need to contact your nephrologist so that he/she can arrange for repair of your permanent dialysis access graft         ________________________________________________________________    Risk of deterioration: Moderate    Condition at Discharge:  Stable  __________________________________________________________________    Disposition  Home with family, no needs    ____________________________________________________________________    Code Status: Full Code  ___________________________________________________________________      Total time in minutes spent coordinating this discharge (includes going over instructions, follow-up, prescriptions, and preparing report for sign off to her PCP) :  >30 minutes    Signed:  Venkata Kelly NP

## 2021-08-30 NOTE — ROUTINE PROCESS
TRANSFER - OUT REPORT:    Verbal report given to Paulina(jack) on Rhianna Amado  being transferred to (unit) for routine post - op       Report consisted of patients Situation, Background, Assessment and   Recommendations(SBAR). Information from the following report(s) SBAR and Procedure Summary was reviewed with the receiving nurse. Lines:   Peripheral IV 08/30/21 Anterior;Proximal;Right Forearm (Active)        Opportunity for questions and clarification was provided.       Patient transported with:   Transporter

## 2021-08-30 NOTE — H&P
Interventional and Vascular Radiology History and Physical    Patient: Franko Carrera 80 y.o. female       Chief Complaint: Vascular Access Problem      History of Present Illness: dialysis     History:    Past Medical History:   Diagnosis Date    Acid reflux 11/11/2020    Acute renal failure ([facility] 11/11/2020    Benign essential HTN 11/11/2020    Chronic congestive heart failure ([facility] 11/11/2020    Chronic kidney disease     Constipation 11/11/2020    Diabetes ([facility] [address]     Heart disease 11/11/2020    Hemodialysis access site with mature fistula (HCC)     left arm    Hx of colonic polyp     Hypothyroidism 11/11/2020    MI (myocardial infarction) ([facility] [address]     OLD MI    Spasm 11/11/2020    Stage 4 chronic kidney disease ([facility] [address] 11/11/2020    Type II diabetes mellitus ([facility] 11/11/2020     Family History   Problem Relation Age of Onset    Diabetes Other     Lung Disease Other     COPD Other      Social History     Socioeconomic History    Marital status:      Spouse name: Not on file    Number of children: Not on file    Years of education: Not on file    Highest education level: Not on file   Occupational History    Not on file   Tobacco Use    Smoking status: Current Every Day Smoker     Packs/day: 0.50    Smokeless tobacco: Never Used   Substance and Sexual Activity    Alcohol use: Never    Drug use: Never    Sexual activity: Not on file   Other Topics Concern    Not on file   Social History Narrative    Not on file     Social Determinants of Health     Financial Resource Strain:     Difficulty of Paying Living Expenses:    Food Insecurity:     Worried About Running Out of Food in the Last Year:     [address] in the Last Year:    Transportation Needs:     Lack of Transportation (Medical):      Lack of Transportation (Non-Medical):    Physical Activity:     Days of Exercise per Week:     Minutes of Exercise per Session:    Stress:     Feeling of Stress :    Social Connections:     Frequency of Communication with Friends and Family:     Frequency of Social Gatherings with Friends and Family:     Attends Anabaptism Services:     Active Member of Clubs or Organizations:     Attends Club or Organization Meetings:     Marital Status:    Intimate Partner Violence:     Fear of Current or Ex-Partner:     Emotionally Abused:     Physically Abused:     Sexually Abused: Allergies:    Allergies   Allergen Reactions    Gabapentin Swelling    Codeine Unknown (comments)     TYLENOL # 3    Heparin Unknown (comments)     SAID MADE HER LOSE HER LEG    Lovenox [Enoxaparin] Unknown (comments)    Lyrica [Pregabalin] Unknown (comments)    Motrin [Ibuprofen] Unknown (comments)    Reglan [Metoclopramide Hcl] Unknown (comments)    Vioxx [Rofecoxib] Unknown (comments)       Current Medications:  Current Facility-Administered Medications   Medication Dose Route Frequency    oxyCODONE IR (ROXICODONE) tablet 5 mg  5 mg Oral Q4H PRN    amLODIPine (NORVASC) tablet 5 mg  5 mg Oral DAILY    hydrALAZINE (APRESOLINE) tablet 100 mg  100 mg Oral DAILY    isosorbide mononitrate ER (IMDUR) tablet 60 mg  60 mg Oral DAILY    losartan (COZAAR) tablet 100 mg  100 mg Oral DAILY    sodium chloride (NS) flush 5-40 mL  5-40 mL IntraVENous Q8H    sodium chloride (NS) flush 5-40 mL  5-40 mL IntraVENous PRN    acetaminophen (TYLENOL) tablet 650 mg  650 mg Oral Q6H PRN    Or    acetaminophen (TYLENOL) suppository 650 mg  650 mg Rectal Q6H PRN    polyethylene glycol (MIRALAX) packet 17 g  17 g Oral DAILY PRN    ondansetron (ZOFRAN ODT) tablet 4 mg  4 mg Oral Q8H PRN    Or    ondansetron (ZOFRAN) injection 4 mg  4 mg IntraVENous Q6H PRN    carvediloL (COREG) tablet 6.25 mg  6.25 mg Oral BID WITH MEALS    clopidogreL (PLAVIX) tablet 75 mg  75 mg Oral DAILY    pantoprazole (PROTONIX) tablet 40 mg  40 mg Oral QAM RT    levothyroxine (SYNTHROID) tablet 50 mcg  50 mcg Oral ACB    pravastatin (PRAVACHOL) tablet 40 mg  40 mg Oral DAILY    sevelamer carbonate (RENVELA) tab 2,400 mg  2,400 mg Oral TID WITH MEALS    aspirin delayed-release tablet 81 mg  81 mg Oral DAILY    insulin lispro (HUMALOG) injection   SubCUTAneous AC&HS    glucose chewable tablet 16 g  4 Tablet Oral PRN    dextrose (D50W) injection syrg 12.5-25 g  12.5-25 g IntraVENous PRN    glucagon (GLUCAGEN) injection 1 mg  1 mg IntraMUSCular PRN    mupirocin (BACTROBAN) 2 % ointment   Topical DAILY    oxyCODONE-acetaminophen (PERCOCET 10)  mg per tablet 1 Tablet  1 Tablet Oral BID PRN     Facility-Administered Medications Ordered in Other Encounters   Medication Dose Route Frequency    sodium citrate 4 % (5 mL) injection 1.4 mL  1.4 mL [facility] DIALYSIS PRN        Physical Exam:  Blood pressure (!) 165/53, pulse 75, temperature 98.7 °F (37.1 °C), resp. rate 17, height 5' 10\" (1.778 m), weight 108.9 kg (240 lb), SpO2 99 %. LUNG: clear to auscultation bilaterally, HEART: regular rate and rhythm, S1, S2 normal, no murmur, click, rub or gallop      Alerts:    Hospital Problems  Date Reviewed: 2/8/2021        Codes Class Noted POA    Hypervolemia ICD-10-CM: E87.70  ICD-9-CM: 276.69  8/28/2021 Unknown              Laboratory:      Recent Labs     08/28/21  1741   HGB 10.3*   HCT 32.9*   WBC 9.4      BUN 53*   CREA 14.50*   K 4.3         Plan of Care/Planned Procedure:  Risks, benefits, and alternatives reviewed with patient and she agrees to proceed with the procedure. Conscious sedation will be performed with IV fentanyl and versed.    Plan is for permcath placement     Marshall Devine MD

## 2021-08-31 NOTE — PROGRESS NOTES
Pt has Rt AKA and has her own wc. Taken to [facility] for Permacath placement for dialysis this afternoon. Tolerated procedure well and  at bedside this afternoon helping pt to bathe. Pt ok for discharge to home. All instructions given no scripts ordered. Saline lock in Rt AC d/c'd and pressure dsg applied. Left via pt own wc with  at 1910.

## 2022-02-23 ENCOUNTER — TELEPHONE (OUTPATIENT)
Dept: GASTROENTEROLOGY | Age: 72
End: 2022-02-23

## 2022-02-23 NOTE — TELEPHONE ENCOUNTER
I spoke with Chelsey De Oliveira. She said ok to send records to Dr Liu Barreto at [facility]. She is going there now for medical care.

## 2022-03-18 PROBLEM — E66.01 SEVERE OBESITY (HCC): Status: ACTIVE | Noted: 2020-11-11

## 2022-03-18 PROBLEM — E03.9 HYPOTHYROIDISM: Status: ACTIVE | Noted: 2020-11-11

## 2022-03-18 PROBLEM — R25.2 SPASM: Status: ACTIVE | Noted: 2020-11-11

## 2022-03-18 PROBLEM — N17.9 ACUTE RENAL FAILURE (HCC): Status: ACTIVE | Noted: 2020-11-11

## 2022-03-19 PROBLEM — K21.9 ACID REFLUX: Status: ACTIVE | Noted: 2020-11-11

## 2022-03-19 PROBLEM — I51.9 HEART DISEASE: Status: ACTIVE | Noted: 2020-11-11

## 2022-03-19 PROBLEM — N18.4 STAGE 4 CHRONIC KIDNEY DISEASE (HCC): Status: ACTIVE | Noted: 2020-11-11

## 2022-03-19 PROBLEM — E87.70 HYPERVOLEMIA: Status: ACTIVE | Noted: 2021-08-28

## 2022-03-19 PROBLEM — E11.9 TYPE II DIABETES MELLITUS (HCC): Status: ACTIVE | Noted: 2020-11-11

## 2022-03-19 PROBLEM — K59.00 CONSTIPATION: Status: ACTIVE | Noted: 2020-11-11

## 2022-03-19 PROBLEM — I50.9 CHRONIC CONGESTIVE HEART FAILURE (HCC): Status: ACTIVE | Noted: 2020-11-11

## 2022-03-19 PROBLEM — I10 BENIGN ESSENTIAL HTN: Status: ACTIVE | Noted: 2020-11-11

## 2023-05-23 RX ORDER — DICYCLOMINE HYDROCHLORIDE 10 MG/1
10 CAPSULE ORAL 3 TIMES DAILY PRN
COMMUNITY
Start: 2021-02-08

## 2023-05-23 RX ORDER — OXYCODONE AND ACETAMINOPHEN 7.5; 325 MG/1; MG/1
TABLET ORAL
COMMUNITY
Start: 2020-10-19

## 2023-05-23 RX ORDER — LOSARTAN POTASSIUM 100 MG/1
100 TABLET ORAL DAILY
COMMUNITY

## 2023-05-23 RX ORDER — CARVEDILOL 6.25 MG/1
TABLET ORAL
COMMUNITY

## 2023-05-23 RX ORDER — LEVOTHYROXINE SODIUM 0.05 MG/1
TABLET ORAL
COMMUNITY
Start: 2020-09-23

## 2023-05-23 RX ORDER — INSULIN ASPART 100 [IU]/ML
INJECTION, SOLUTION INTRAVENOUS; SUBCUTANEOUS
COMMUNITY

## 2023-05-23 RX ORDER — PRAVASTATIN SODIUM 40 MG
TABLET ORAL
COMMUNITY

## 2023-05-23 RX ORDER — SEVELAMER CARBONATE 800 MG/1
TABLET, FILM COATED ORAL
COMMUNITY
Start: 2020-10-27

## 2023-05-23 RX ORDER — LIDOCAINE AND PRILOCAINE 25; 25 MG/G; MG/G
CREAM TOPICAL
COMMUNITY
Start: 2020-09-28

## 2023-05-23 RX ORDER — CLOPIDOGREL BISULFATE 75 MG/1
TABLET ORAL
COMMUNITY
Start: 2020-09-18

## 2023-05-23 RX ORDER — AMLODIPINE BESYLATE 5 MG/1
5 TABLET ORAL DAILY
COMMUNITY

## 2023-05-23 RX ORDER — COLCHICINE 0.6 MG/1
TABLET ORAL
COMMUNITY

## 2023-05-23 RX ORDER — ASPIRIN 81 MG/1
81 TABLET ORAL
COMMUNITY

## 2023-05-23 RX ORDER — HYDRALAZINE HYDROCHLORIDE 100 MG/1
100 TABLET, FILM COATED ORAL DAILY
COMMUNITY
Start: 2020-09-10

## 2023-05-23 RX ORDER — NALOXONE HYDROCHLORIDE 4 MG/.1ML
SPRAY NASAL
COMMUNITY
Start: 2021-08-30

## 2023-05-23 RX ORDER — PANTOPRAZOLE SODIUM 40 MG/1
1 TABLET, DELAYED RELEASE ORAL DAILY
COMMUNITY
Start: 2021-06-01

## 2023-05-23 RX ORDER — ISOSORBIDE MONONITRATE 30 MG/1
60 TABLET, EXTENDED RELEASE ORAL
COMMUNITY

## 2023-05-23 RX ORDER — OXYCODONE HCL 10 MG/1
TABLET, FILM COATED, EXTENDED RELEASE ORAL
COMMUNITY
Start: 2020-10-19